# Patient Record
(demographics unavailable — no encounter records)

---

## 2024-10-07 NOTE — RESULTS/DATA
[FreeTextEntry1] : labwork from 10/28/2018: hga1c 4.7, gluc 88, chol 215, tg 182, hdl 43, ldl 160\par  reported weight 148 (11/4/2019), bmi 23.9\par  \par  EKG from 2/24/2021 reviewed: LAFB noted; Qtc 423ms; patient advised to f/u with PMD in context of his MS and EKG results\par  Lab work from 1/2021 reviewed; Cholesterol and HDL and A1C noted and advised patient to f/u with PMD for same and encouraged diet modification.

## 2024-10-07 NOTE — SOCIAL HISTORY
[FreeTextEntry1] : Substance: patient uses nicotine vape up to 5x/day;\par  \par  Social hx: domiciled alone, has hha 10hrs/day currently on SSD, formerly worked as a ; , 2 daughters, one who passed away in fatal car accident in 2/2021 at age of 29.

## 2024-10-07 NOTE — RISK ASSESSMENT
[No, patient denies ideation or behavior] : No, patient denies ideation or behavior [FreeTextEntry8] : Pt has elevated  chronic risk of self harm for his situation, losses, chronic passive SI

## 2024-10-07 NOTE — HISTORY OF PRESENT ILLNESS
[No] : no [Yes] : yes [Yes > 3 months ago] : yes, > 3 months ago [None Known] : none known [Chronic pain or acute medical issues] : chronic pain or acute medical issues [Recent loss] : recent loss [Responsibility to family or others] : responsibility to family or others [Identifies reasons for living] : identifies reasons for living [Future oriented] : future oriented [Supportive social network or family] : supportive social network or family [Fear of death or dying due to pain/suffering] : fear of death or dying due to pain/suffering [High spirituality] : high spirituality [Positive therapeutic relationships] : positive therapeutic relationships [Ability to cope with stress] : ability to cope with stress [TextBox_32] : Patient does not appear to have any imminent threat to self and/or others at this time evidenced by lack of active suicidal ideation, intent, plan, future orientation, willing to seek support in times of crisis, residential stability, improved sleep, medication compliance, spirituality and Tenriism. Safety plan reviewed. Chronic risk of chronic medical issues and past SA mitigated by above.  [FreeTextEntry1] : Pt came in person for his F/U visit. Pt is doing a little bit better. He looks a bit healthier. He doesn't cough and doesn't have severe SOB this time. He gained a bit of weight. Spoke about his new HA. He says that his new HA is not bad, but his old one was really good.  I discussed adjustment strategy with him. He promised to be patient.  Pt takes benadryl from his PCP. He says that he takes it for sleep. We discussed side effects, but I wouldn't stop it. I told him to speak with his PCP. Pt is getting a new IM medication for MS. He says that he had been on tysabri for MS for 14 y and then his neuro stopped it and switched to this medication. Since then, pt feels much worse. His L side became almost paralyzed and very weak. Pt is a very poor historian. I provided him with a lot of support and comfort.  Neuro team is working on this matter. Reports being compliant. Says that he sleeps better after he takes neurontin and ambien qhs, yet, it takes several hours to fall asleep. He also takes melatonin, but it doesn't seem to work well.  Pt will keep the rest of meds the same. Pt has his chronic passive suicidal thoughts, (has had them for years), but denies active SI, intent or plan and says that they are at baseline. Pt reports that he quit vaping/smoking, stating that he "doesn't want to die." He hasn't been vaping for more than 1y, so far. He says that he has fewer cravings after he stopped. He notes that nicotine gum has been helpful with nicotine cessation.   He doesn't  attend [facility] and [facility] once weekly now because of his sickness, but maintains phone contact with his mother, brother who live on [place]. Spoke about his david and God. Also about "evil in this world". Pt has very eccentric believes (speaks a lot about black magic, exorcism, evil spirit, spells, etc). Pt has had these believes for many years (delusional disorder vs schizotypal PD?). Denies other sxs of acute exacerbation of depressive sxs. Denies thoughts to harm himself/others or end his life and engages in safety planning of calling writer, therapist, family, or 911 if he has thoughts of ending his life.  Denies sx of psychosis, leo.  Pt adamantly denies active SI/SP/intent/HI

## 2024-10-07 NOTE — REASON FOR VISIT
[Patient] : Patient [Follow-Up Visit] : a follow-up visit [Other: _____] : [unfilled] [FreeTextEntry1] : medication management

## 2024-10-07 NOTE — DISCUSSION/SUMMARY
[FreeTextEntry1] : Pt came in person for his F/U visit. Pt is doing a little bit better. He looks a bit healthier. He doesn't cough and doesn't have severe SOB this time. He gained a bit of weight. Spoke about his new HA. He says that his new HA is not bad, but his old one was really good.  I discussed adjustment strategy with him. He promised to be patient.  Pt takes benadryl from his PCP. He says that he takes it for sleep. We discussed side effects, but I wouldn't stop it. I told him to speak with his PCP. Pt is getting a new IM medication for MS. He says that he had been on tysabri for MS for 14 y and then his neuro stopped it and switched to this medication. Since then, pt feels much worse. His L side became almost paralyzed and very weak. Pt is a very poor historian. I provided him with a lot of support and comfort.  Neuro team is working on this matter. Reports being compliant. Says that he sleeps better after he takes neurontin and ambien qhs, yet, it takes several hours to fall asleep. He also takes melatonin, but it doesn't seem to work well.  Pt will keep the rest of meds the same. Pt has his chronic passive suicidal thoughts, (has had them for years), but denies active SI, intent or plan and says that they are at baseline. Pt reports that he quit vaping/smoking, stating that he "doesn't want to die." He hasn't been vaping for more than 1y, so far. He says that he has fewer cravings after he stopped. He notes that nicotine gum has been helpful with nicotine cessation.   He doesn't  attend [facility] and [employer] once weekly now because of his sickness, but maintains phone contact with his mother, brother who live on [place]. Spoke about his david and God. Also about "evil in this world". Pt has very eccentric believes (speaks a lot about black magic, exorcism, evil spirit, spells, etc). Pt has had these believes for many years (delusional disorder vs schizotypal PD?). Denies other sxs of acute exacerbation of depressive sxs. Denies thoughts to harm himself/others or end his life and engages in safety planning of calling writer, therapist, family, or 911 if he has thoughts of ending his life.  Denies sx of psychosis, leo.  Pt adamantly denies active SI/SP/intent/HI  Pt is a 58yo M, ,  ex wife (she took away their house and left him with nothing. He calls her evil. He got  in 2005 and was Dxd with MS in 2006). has MS (since 2006), has a daughter (22yo, his other daughter, who was 29, was killed in MVA in 2021) and ex wife in [place], domiciled alone (has a HA 8h 7 days a week), SSD, with long h/o mental illness, several prior IPP(started before he was Dxd with MS), no prior SA, (but with long h/o passive SI), who was seen by dr. Estrella. 1. Next appt in 1m in person 2. Continue meds(doesn't want to change anything). Continue celexa for depression and anxiety, abilify for psychosis and as augmentation for depression, trazodone and ambien for insomnia. I increased trazodone back to 300mg qhs (his insurance doesn't cover 3 tabs. I gave 150mg 2 tabs). I also told him to try to take 2 tabs of neurontin at night (he doesn't sleep well) 3. Needs PA for ambien q 6m at some point (I called last time and it didn't need PA)

## 2024-10-07 NOTE — PHYSICAL EXAM
[Spastic] : spastic [0] : 0 [1] : 1 [No] : No [Dysarthria] : dysarthria [Delusions] : delusions [Dysphoric] : dysphoric [Constricted] : constricted [Normal] : good [FreeTextEntry2] : MS. In a wheelchair [FreeTextEntry3] : MS [FreeTextEntry4] : MS [FreeTextEntry1] : disabled in wheelchair [FreeTextEntry5] : severe impairment [FreeTextEntry8] : so-so [de-identified] : grossly intact [de-identified] : grossly intact [de-identified] : grossly intact [de-identified] : grossly intact

## 2024-10-07 NOTE — PHYSICAL EXAM
[Spastic] : spastic [0] : 0 [1] : 1 [No] : No [Dysarthria] : dysarthria [Delusions] : delusions [Dysphoric] : dysphoric [Constricted] : constricted [Normal] : good [FreeTextEntry2] : MS. In a wheelchair [FreeTextEntry3] : MS [FreeTextEntry4] : MS [FreeTextEntry1] : disabled in wheelchair [FreeTextEntry5] : severe impairment [FreeTextEntry8] : so-so [de-identified] : grossly intact [de-identified] : grossly intact [de-identified] : grossly intact [de-identified] : grossly intact

## 2024-10-07 NOTE — PAST MEDICAL HISTORY
[FreeTextEntry1] : Past psych hx: multiple IPP admissions (most recent 2006), 2 SA (+20 years), no SIB, started psych care in 1997; Past meds: vistaril, Chantix, respiratory depression with clonazepam; significant suicidal ideations w/ Prozac; adverse side effects to Remeron and Seroquel (dizziness at 75mg; poor efficacy at 50mg) when attempted to cross titrate w/ Ambien for insomnia.

## 2024-10-07 NOTE — HISTORY OF PRESENT ILLNESS
[No] : no [Yes] : yes [Yes > 3 months ago] : yes, > 3 months ago [None Known] : none known [Chronic pain or acute medical issues] : chronic pain or acute medical issues [Recent loss] : recent loss [Responsibility to family or others] : responsibility to family or others [Identifies reasons for living] : identifies reasons for living [Future oriented] : future oriented [Supportive social network or family] : supportive social network or family [Fear of death or dying due to pain/suffering] : fear of death or dying due to pain/suffering [High spirituality] : high spirituality [Positive therapeutic relationships] : positive therapeutic relationships [Ability to cope with stress] : ability to cope with stress [TextBox_32] : Patient does not appear to have any imminent threat to self and/or others at this time evidenced by lack of active suicidal ideation, intent, plan, future orientation, willing to seek support in times of crisis, residential stability, improved sleep, medication compliance, spirituality and Confucianist. Safety plan reviewed. Chronic risk of chronic medical issues and past SA mitigated by above.  [FreeTextEntry1] : Pt came in person for his F/U visit. Pt is doing a little bit better. He looks a bit healthier. He doesn't cough and doesn't have severe SOB this time. He gained a bit of weight. Spoke about his new HA. He says that his new HA is not bad, but his old one was really good.  I discussed adjustment strategy with him. He promised to be patient.  Pt takes benadryl from his PCP. He says that he takes it for sleep. We discussed side effects, but I wouldn't stop it. I told him to speak with his PCP. Pt is getting a new IM medication for MS. He says that he had been on tysabri for MS for 14 y and then his neuro stopped it and switched to this medication. Since then, pt feels much worse. His L side became almost paralyzed and very weak. Pt is a very poor historian. I provided him with a lot of support and comfort.  Neuro team is working on this matter. Reports being compliant. Says that he sleeps better after he takes neurontin and ambien qhs, yet, it takes several hours to fall asleep. He also takes melatonin, but it doesn't seem to work well.  Pt will keep the rest of meds the same. Pt has his chronic passive suicidal thoughts, (has had them for years), but denies active SI, intent or plan and says that they are at baseline. Pt reports that he quit vaping/smoking, stating that he "doesn't want to die." He hasn't been vaping for more than 1y, so far. He says that he has fewer cravings after he stopped. He notes that nicotine gum has been helpful with nicotine cessation.   He doesn't  attend [facility] and [facility] once weekly now because of his sickness, but maintains phone contact with his mother, brother who live on [place]. Spoke about his david and God. Also about "evil in this world". Pt has very eccentric believes (speaks a lot about black magic, exorcism, evil spirit, spells, etc). Pt has had these believes for many years (delusional disorder vs schizotypal PD?). Denies other sxs of acute exacerbation of depressive sxs. Denies thoughts to harm himself/others or end his life and engages in safety planning of calling writer, therapist, family, or 911 if he has thoughts of ending his life.  Denies sx of psychosis, leo.  Pt adamantly denies active SI/SP/intent/HI

## 2024-10-07 NOTE — ASSESSMENT
[FreeTextEntry1] : Patient is a 56 year old male with pphx of MDD and insomnia,w/ pmhx of MS w/ left sided hemiplegia, COPD, chronic back pain, Diabetes Insipidus evaluated for follow up.  \par  \par  Upon evaluation, pt denies any acute changes in mood. Will continue to optimize Abilify dose, as pt previously reports good response to Abilify 10mg. Pt self d/c Wellbutrin reporting worsening mood than has since improved since stopping. He continues to express desire to stop vaping, has restarted nicotine gum with good effect. \par  \par  Pt has been extensively educated on the risks of co-administering medication with other medications that have respiratory and CNS depressive effects, and increased his risked given his hx of COPD and oxygen requirement. Pt shows understanding and acknowledgment of these risks. \par  \par  No suicidal or homicidal thoughts. No overt psychosis or leo on exam. Patient has appropriate protective factors in place. Is engaged in his treatment. No acute safety concerns. Safety plan discussed at lengths with patient.\par  \par   Plan\par  **-ALL meds have to be sent in by attending, per pharmacy***\par  -increase abilify 5mg to 7mg \par  - Continue Celexa 40 mg q24h for depression and anxiety \par  - Continue Ambien 12.5mg CR PO qhs for insomnia (istop reviewed)\par  - continue Trazodone 200mg PO qhs prn for insomnia \par  -continue melatonin 10mg po qhs \par  -continue Nicotine gum \par  - f/u with psychotherapist, Madhuri Sullivan\par  -RTC 4 weeks\par  TRANSITION OF CARE DISCUSSED

## 2024-10-07 NOTE — DISCUSSION/SUMMARY
[FreeTextEntry1] : Pt came in person for his F/U visit. Pt is doing a little bit better. He looks a bit healthier. He doesn't cough and doesn't have severe SOB this time. He gained a bit of weight. Spoke about his new HA. He says that his new HA is not bad, but his old one was really good.  I discussed adjustment strategy with him. He promised to be patient.  Pt takes benadryl from his PCP. He says that he takes it for sleep. We discussed side effects, but I wouldn't stop it. I told him to speak with his PCP. Pt is getting a new IM medication for MS. He says that he had been on tysabri for MS for 14 y and then his neuro stopped it and switched to this medication. Since then, pt feels much worse. His L side became almost paralyzed and very weak. Pt is a very poor historian. I provided him with a lot of support and comfort.  Neuro team is working on this matter. Reports being compliant. Says that he sleeps better after he takes neurontin and ambien qhs, yet, it takes several hours to fall asleep. He also takes melatonin, but it doesn't seem to work well.  Pt will keep the rest of meds the same. Pt has his chronic passive suicidal thoughts, (has had them for years), but denies active SI, intent or plan and says that they are at baseline. Pt reports that he quit vaping/smoking, stating that he "doesn't want to die." He hasn't been vaping for more than 1y, so far. He says that he has fewer cravings after he stopped. He notes that nicotine gum has been helpful with nicotine cessation.   He doesn't  attend [facility] and [facility] once weekly now because of his sickness, but maintains phone contact with his mother, brother who live on [place]. Spoke about his david and God. Also about "evil in this world". Pt has very eccentric believes (speaks a lot about black magic, exorcism, evil spirit, spells, etc). Pt has had these believes for many years (delusional disorder vs schizotypal PD?). Denies other sxs of acute exacerbation of depressive sxs. Denies thoughts to harm himself/others or end his life and engages in safety planning of calling writer, therapist, family, or 911 if he has thoughts of ending his life.  Denies sx of psychosis, leo.  Pt adamantly denies active SI/SP/intent/HI  Pt is a 60yo M, ,  ex wife (she took away their house and left him with nothing. He calls her evil. He got  in 2005 and was Dxd with MS in 2006). has MS (since 2006), has a daughter (20yo, his other daughter, who was 29, was killed in MVA in 2021) and ex wife in [place], domiciled alone (has a HA 8h 7 days a week), SSD, with long h/o mental illness, several prior IPP(started before he was Dxd with MS), no prior SA, (but with long h/o passive SI), who was seen by dr. Estrella. 1. Next appt in 1m in person 2. Continue meds(doesn't want to change anything). Continue celexa for depression and anxiety, abilify for psychosis and as augmentation for depression, trazodone and ambien for insomnia. I increased trazodone back to 300mg qhs (his insurance doesn't cover 3 tabs. I gave 150mg 2 tabs). I also told him to try to take 2 tabs of neurontin at night (he doesn't sleep well) 3. Needs PA for ambien q 6m at some point (I called last time and it didn't need PA)

## 2024-10-10 NOTE — ASSESSMENT
[FreeTextEntry1] : #Asthma/COPD - at baseline - following pulmonary - 2/2023 CT lung cancer screening: no nodules, unilateral L pleural calcifications - c/w BIPAP qHS - c/w spiriva and symbicort - continue to follow up w/ pulm  #MS Complicated by neurogenic bladder and Chronic Pain - recurrent UTIs - reports no symptoms with urination - Seen by Urology 4/2024 - c/w CIC q 4-6hrs - pt had infusion rx with Ocrevus in July 2023 - pt has pain in extremities since last infusion -follows with neurology, who gave him neurosurgery referral for possible chronic cord compression. Off DMT for now -patient advised to seek counselling from a specialized MS center, given options ([facility] and [facility])  # depression - followed by Mental Health - c/w abilify 7mg, Celexa 40 mg q24h, Ambien 12.5mg CR PO qhs, Trazodone 200mg PO qhs prn, melatonin 10mg po qhs - f/u with psychotherapist  #Chronic Back pain #Diffuse pain - Percocet 5mg bid for severe pain PRN for 30 days  # Endocrine - on testosterone replace therapy - feels better with current dose  #DI -Followed by nephrologist -Desmopressin 2-2-2  #HCM -colonoscopy 4-5 years ago -FIT 05/22 negative.

## 2024-10-10 NOTE — HISTORY OF PRESENT ILLNESS
[FreeTextEntry1] : follow up [de-identified] : 59 YOM w an extensive PMHx including MS being wheelchair bound complicated by neurogenic bladder, DI, normocytic anemia, severe COPD/asthma w chest vest, hypogonadism, and presenting for follow up. Pt has no new complaints today

## 2024-10-10 NOTE — PHYSICAL EXAM
[No Acute Distress] : no acute distress [Normal Sclera/Conjunctiva] : normal sclera/conjunctiva [Normal Outer Ear/Nose] : the outer ears and nose were normal in appearance [Normal Oropharynx] : the oropharynx was normal [No Respiratory Distress] : no respiratory distress  [No Accessory Muscle Use] : no accessory muscle use [Clear to Auscultation] : lungs were clear to auscultation bilaterally [No Edema] : there was no peripheral edema [Soft] : abdomen soft [Non Tender] : non-tender [Non-distended] : non-distended [Normal Affect] : the affect was normal [Normal Insight/Judgement] : insight and judgment were intact

## 2024-10-10 NOTE — REVIEW OF SYSTEMS
[Fever] : no fever [Vision Problems] : no vision problems [Chest Pain] : no chest pain [Shortness Of Breath] : no shortness of breath [Abdominal Pain] : no abdominal pain [Dysuria] : no dysuria [Joint Pain] : no joint pain [Skin Rash] : no skin rash [Headache] : no headache

## 2024-10-16 NOTE — DISCUSSION/SUMMARY
[FreeTextEntry1] : The patient is a medication only patient, the case has been discussed with the psychiatrist.

## 2024-10-25 NOTE — HISTORY OF PRESENT ILLNESS
[>= 20 pack years] : >= 20 pack years [TextBox_4] : 60 YO man PMH MS and COPD presenting for follow-up. Recently hospitalized 10/14/24 for respiratory failure secondary to pneumonia likely due to muscle weakness/secretion retention secondary to MS. Positive UTI. Patient currently comfortable breathing room air, only uses BiPAP at night and requests switch from mask to NC. No wheezing or coughing today. Patient compliant with antibiotics. No dyspnea.

## 2024-10-25 NOTE — END OF VISIT
[] : Resident [FreeTextEntry3] : Continue DuoNebs as needed, saline nebs, Mucinex as well  Chest vest, Acapella valve for secretion clearance  Symbicort and Spiriva daily  Recent admission with pneumonia - CXR next visit  Nasal pillows ordered for BIPAP machine as well  1 month follow iup

## 2024-10-25 NOTE — REVIEW OF SYSTEMS
[Fever] : no fever [Fatigue] : no fatigue [Chills] : no chills [Dry Eyes] : no dry eyes [Ear Disturbance] : no ear disturbance [Eye Irritation] : no eye irritation [Nasal Congestion] : no nasal congestion [Sinus Problems] : no sinus problems [Cough] : no cough [Chest Tightness] : no chest tightness [Sputum] : no sputum [Dyspnea] : no dyspnea [Chest Discomfort] : no chest discomfort [Edema] : no edema [Orthopnea] : no orthopnea [Syncope] : no syncope [Nasal Discharge] : no nasal discharge [GERD] : no gerd [Abdominal Pain] : no abdominal pain [Diarrhea] : no diarrhea [Constipation] : no constipation [Urgency] : no frequency [Dysuria] : no urgency [Back Pain] : no back pain [Rash] : no rash [Anemia] : no anemia [Headache] : no headache [Focal Weakness] : no focal weakness [Dizziness] : no dizziness [Anxiety] : no anxiety [Diabetes] : no diabetes [Obesity] : no obesity

## 2024-10-25 NOTE — ASSESSMENT
[FreeTextEntry1] : #Chronic hypercapnic respiratory failure 2/2 COPD and neuromuscular dz (Ms) # COPD on 2.5L oxygen PRN and BIPAP # Ex-smoker of 60 pack/year #MS - Recently admitted 10/14 community acquired pneumonia + UTI, likely due to muscle weakness and secretion retention due to MS - c/w azithromycin 500 PO QD - Patient breathing well today on room air, no NC uses BiPAP at night - Patient prefers nasal pillow over mask, ordered today -abg compensated chronic hypercapnia -attempted pft unable to coordinate (ms) - c/w supplemental oxygen use PRN and BIPAP at night - c/w spiriva, symbicort, albuterol, nebulizers -Last CT chest November 2023with centrilobular nodules, next in November 2024 ordered today - c/w Incentive spirometry - c/w Chest Vibrating Vest - c/w Mucinex, saline - follow in 1 month with repeat CXR for f/u pneumonia resolution

## 2024-10-25 NOTE — PHYSICAL EXAM
[No Acute Distress] : no acute distress [Normal Oropharynx] : normal oropharynx [Normal Appearance] : normal appearance [Normal Rate/Rhythm] : normal rate/rhythm [Normal S1, S2] : normal s1, s2 [No Murmurs] : no murmurs [No Resp Distress] : no resp distress [Clear to Auscultation Bilaterally] : clear to auscultation bilaterally [No Abnormalities] : no abnormalities [Benign] : benign [Deformity] : deformity [No Edema] : no edema [Normal Color/ Pigmentation] : normal color/ pigmentation [No Focal Deficits] : no focal deficits [Oriented x3] : oriented x3 [Normal Affect] : normal affect [TextBox_99] : Hx multiple sclerosis, spine fracture

## 2024-11-06 NOTE — HISTORY OF PRESENT ILLNESS
[FreeTextEntry1] : Presented with his home health aid. Pt actively on O2 via NC. 58M w hx of MS, wheel chair bound, COPD on NC, NGB previously treated with CIC but since his most recent admission he has been performing CIC. He has been most recently suffering from recurrent UTIs, some of which have required hospital admissions. Most recently admitted to [facility] and [facility] where he was treated for UTI and discharged after a couple of days. He was suppose to see me ~6 weeks ago but his appointment was canceled as he was having issues with his portable O2 tank. Today he reports having no new symptoms or complaints, wanted to establish care for preventive urologic management of his NGB c/b recurrent UTIs from his CIC which he performs 4x/day. He does not void between catheterizations. Has not noticed any gross hematuria. No recent fevers or chills.  Office visit 1/22/24 Patient had a recent visit to the ER after there were issues with his Machado catheter. Apparently it was removed with the balloon still inflated because some trauma to his urethra. There was also concern for retained foreign body/remnant of catheter in his bladder. Pelvic CT at that time was only significant for to moderate size bladder stones measuring approximately 2 cm each. Today patient reports no recent fevers chills gross hematuria, he has been able to intermittently self catheterize without issues although complains of having decreased urinary output from his baseline.  UCx - Proteus and Enterococcus 12/2023  CT Pelvic 1/2024 PELVIC ORGANS: Machado catheter balloon within the urinary bladder lumen. Trace intraluminal air. Calculi within the dependent portion of the urinary bladder. First calculus measures 1.8 x 2.2 cm. Second calculus measures approximately 1.6 x 1.1 cm. A discrete foreign body is not visualized. IMPRESSION: A retained foreign body within the lower pelvis or urinary bladder is not visualized. Machado catheter balloon appropriately positioned within the urinary bladder lumen. Two calculi within the urinary bladder, measuring 1.8 x 2.2 cm and 1.6 x 1.1 cm respectively.  OFFICE VISIT 4/29/24: Pt presents today for follow-up appointment. States he self catheterizes every 4-6 hours without issue, denies suprapubic pain. The patient denies having any fevers, chills, nausea, vomiting, flank/abdominal pain or any irritative voiding symptoms. UA 4/16/24 grossly positive  Office Visit 11/06/2024  Pt reports no new complaints. Had one breakthrough UTI while on daily ppx. Today he denies having any fevers, chills, nausea, vomiting, flank/abdominal pain or any irritative voiding symptoms. He was also concerned about his inability to achieve orgasm. He also has ED but is not interested in erectile function and penile rigidity.

## 2024-11-06 NOTE — ASSESSMENT
[FreeTextEntry1] : #NGB/CIC/Recurrent UTI/Bladder Calculi - Pt has been performing CIC q4-6hr for his NGB 2/2 his MS - CT Pelvis reviewed 1/2024. Showed two moderate sized bladder calculi measuring 2cm each. Patient refusing surgery given his severe pulmonary condition and oxygen dependency. Pt understands risks of having recurrent UTIs and possible hospital admissions for sepsis because if his bladder stones which can harbor bacteria.  - Pt reports not tolerating methenamine daily and would like to stop taking the medication - Pt is not a good candidate for daily macrobid ppx given his severe pulmonary issues  - Pt has multiple resistances to cephalosporins making daily ppx with them a poor choice - Start TMP-SMX 40mg/200mg once daily - UA/UCx - RBUS to assess for hydronephrosis and stone burden - c/w CIC q 4-6hrs - F/u w ID for further recs regarding  - RTC in 3-6 months  #Anorgasmia - The utility of additional testing in the management of DE is unclear, in large part because the etiology of the disorder is incompletely understood and predisposing factors can generally be elicited with a careful history. The prevalence of symptoms consistent with DE increase at progressively lower serum T levels. Given the relationship between many domains of male sexual function and serum T concentration, the Panel supports morning T testing as recommended by the AUA Guideline on the Management of Testosterone Deficiency.  There is few data to support additional adjunctive laboratory studies. Basic serum studies including electrolytes, lipids, glycosylated hemoglobin, and possibly others may be informative of medical conditions that could predispose to neuropathy (e.g., diabetes, HIV infection in at risk patients) or vascular disease (e.g., hypertension, hyperlipidemia). These conditions may contribute to sexual dysfunction, including DE.  Penile vibratory stimulation has been suggested as a potential management strategy in men with DE, particularly in men with a penile sensory deficit.

## 2024-11-07 NOTE — PHYSICAL EXAM
[Spastic] : spastic [0] : 0 [1] : 1 [No] : No [Dysarthria] : dysarthria [Delusions] : delusions [Dysphoric] : dysphoric [Constricted] : constricted [Normal] : good [FreeTextEntry2] : MS. In a wheelchair [FreeTextEntry3] : MS [FreeTextEntry4] : MS [FreeTextEntry1] : disabled in wheelchair [FreeTextEntry5] : severe impairment [FreeTextEntry8] : so-so [de-identified] : grossly intact [de-identified] : grossly intact [de-identified] : grossly intact [de-identified] : grossly intact

## 2024-11-07 NOTE — HISTORY OF PRESENT ILLNESS
[No] : no [Yes] : yes [Yes > 3 months ago] : yes, > 3 months ago [None Known] : none known [Chronic pain or acute medical issues] : chronic pain or acute medical issues [Recent loss] : recent loss [Responsibility to family or others] : responsibility to family or others [Identifies reasons for living] : identifies reasons for living [Future oriented] : future oriented [Supportive social network or family] : supportive social network or family [Fear of death or dying due to pain/suffering] : fear of death or dying due to pain/suffering [High spirituality] : high spirituality [Positive therapeutic relationships] : positive therapeutic relationships [Ability to cope with stress] : ability to cope with stress [TextBox_32] : Patient does not appear to have any imminent threat to self and/or others at this time evidenced by lack of active suicidal ideation, intent, plan, future orientation, willing to seek support in times of crisis, residential stability, improved sleep, medication compliance, spirituality and Latter day. Safety plan reviewed. Chronic risk of chronic medical issues and past SA mitigated by above.  [FreeTextEntry1] : Pt came in person for his F/U visit. Pt is doing a little bit better. He looks a bit healthier. He doesn't cough and doesn't have severe SOB this time. He gained a bit of weight. Spoke about his new HA. He says that his new HA is not bad, but his old one was really good.  I discussed adjustment strategy with him. He promised to be patient.  Pt takes benadryl from his PCP. He says that he takes it for sleep. We discussed side effects, but I wouldn't stop it. I told him to speak with his PCP. Pt is getting a new IM medication for MS. He says that he had been on tysabri for MS for 14 y and then his neuro stopped it and switched to this medication. Since then, pt feels much worse. His L side became almost paralyzed and very weak. Pt is a very poor historian. I provided him with a lot of support and comfort.  Neuro team is working on this matter. Reports being compliant. Says that he sleeps better after he takes neurontin and ambien qhs, yet, it takes several hours to fall asleep. He also takes melatonin, but it doesn't seem to work well.  Pt will keep the rest of meds the same. Pt has his chronic passive suicidal thoughts, (has had them for years), but denies active SI, intent or plan and says that they are at baseline. Pt reports that he quit vaping/smoking, stating that he "doesn't want to die." He hasn't been vaping for more than 1y, so far. He says that he has fewer cravings after he stopped. He notes that nicotine gum has been helpful with nicotine cessation.   He doesn't  attend [employer] and [employer] once weekly now because of his sickness, but maintains phone contact with his mother, brother who live on [place]. Spoke about his david and God. Also about "evil in this world". Pt has very eccentric believes (speaks a lot about black magic, exorcism, evil spirit, spells, etc). Pt has had these believes for many years (delusional disorder vs schizotypal PD?). Denies other sxs of acute exacerbation of depressive sxs. Denies thoughts to harm himself/others or end his life and engages in safety planning of calling writer, therapist, family, or 911 if he has thoughts of ending his life.  Denies sx of psychosis, leo.  Pt adamantly denies active SI/SP/intent/HI

## 2024-11-07 NOTE — DISCUSSION/SUMMARY
[FreeTextEntry1] : Pt came in person for his F/U visit. Pt says that the new medication for MS, Ocrevus, doesn't help him at all and his MS is getting worse. Pt. He again talks about tysapri and says that it was much better. As per neuro note, pt's sister insisted in continuing ocrevus. I called her and discussed the matter in pt's presence. Pt was comforted and he seemed to be calmer by the end of the session. Pt is in pain. His sister says that the pt refuses some recommended by physicians over the counter meds and that can contribute to his worsening of pain. Pt agreed to add melatonin, vit B12, cocutene, etc. Pt takes benadryl from his PCP. He says that he takes it for sleep. We discussed side effects, but I wouldn't stop it. I told him to speak with his PCP. Reports being compliant. Says that he sleeps better after he takes neurontin and ambien qhs, yet, it takes several hours to fall asleep.  Pt has his chronic passive suicidal thoughts, (has had them for years), but denies active SI, intent or plan and says that they are at baseline. Pt reports that he quit vaping/smoking, stating that he "doesn't want to die." He hasn't been vaping for more than 1y, so far. He says that he has fewer cravings after he stopped. He notes that nicotine gum has been helpful with nicotine cessation.   He doesn't attend [facility] and [employer] once weekly now because of his sickness, but maintains phone contact with his mother, brother who live on [place]. Spoke about his david and God. Also about "evil in this world". Pt has very eccentric believes (speaks a lot about black magic, exorcism, evil spirit, spells, etc). Pt has had these believes for many years (delusional disorder vs schizotypal PD?). Denies other sxs of acute exacerbation of depressive sxs. Denies thoughts to harm himself/others or end his life and engages in safety planning of calling writer, therapist, family, or 911 if he has thoughts of ending his life.  Denies sx of psychosis, leo.  Pt medhatantly denies active SI/SP/intent/HI  Pt is a 60yo M, ,  ex wife (she took away their house and left him with nothing. He calls her evil. He got  in 2005 and was Dxd with MS in 2006). has MS (since 2006), has a daughter (20yo, his other daughter, who was 29, was killed in MVA in 2021) and ex wife in [place], domiciled alone (has a HA 8h 7 days a week), SSD, with long h/o mental illness, several prior IPP(started before he was Dxd with MS), no prior SA, (but with long h/o passive SI), who was seen by dr. Estrella. 1. Next appt in 1m in person 2. Continue meds(doesn't want to change anything). Continue celexa for depression and anxiety, abilify for psychosis and as augmentation for depression, trazodone and ambien for insomnia. I increased trazodone back to 300mg qhs (his insurance doesn't cover 3 tabs. I gave 150mg 2 tabs). I also told him to try to take 2 tabs of neurontin at night (he doesn't sleep well) 3. Needs PA for ambien q 6m at some point (I called last time and it didn't need PA) 4. I spoke to his sister Lilly on the phone in pt's presence to discuss his wish for tysapri. (he is on ocrevus. His next injection is in February)

## 2024-12-12 NOTE — DISCUSSION/SUMMARY
[FreeTextEntry1] : Pt is a no show. I called him. He is in hospital and he doesn't know why and where exactly (for a medical reason). He will make an appt when he will be discharged

## 2025-01-14 NOTE — PHYSICAL EXAM
[No Acute Distress] : no acute distress [Well Nourished] : well nourished [Normal Sclera/Conjunctiva] : normal sclera/conjunctiva [PERRL] : pupils equal round and reactive to light [Normal Outer Ear/Nose] : the outer ears and nose were normal in appearance [Supple] : supple [No Respiratory Distress] : no respiratory distress  [No Accessory Muscle Use] : no accessory muscle use [Clear to Auscultation] : lungs were clear to auscultation bilaterally [Normal Rate] : normal rate  [Regular Rhythm] : with a regular rhythm [Normal S1, S2] : normal S1 and S2 [No Edema] : there was no peripheral edema [Soft] : abdomen soft [Non Tender] : non-tender [Non-distended] : non-distended [Normal Bowel Sounds] : normal bowel sounds [No Spinal Tenderness] : no spinal tenderness [No Focal Deficits] : no focal deficits [Normal Affect] : the affect was normal

## 2025-01-14 NOTE — HISTORY OF PRESENT ILLNESS
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  He presented to the ED for evaluation of midsternal chest pain radiating to upper back. Patient was also admitted earlier in december to the hospital for 2-week stay treated for COPD exacerbation and was being treated for left-sided pneumonia/atelectasis, hospital course c/b aphasia & R weakness s/p TNK and NSICU monitoring. Negative MRI. Since discharge, patient complaining of worsening chest pain. In the ED, patient spiked fever at 103 and required 3L NC. Patient was admitted to ICU for acute on chronic hypoxemic and hypercapnic respiratory failure. He was found to have Covid pneumonia. His course was complicated by Central DI, and aspiration pneumonia. Patient was also found to have urinary retention and was discharged with foy.   Today, he is only comaplaining of weakness. His foy was removed and he gets straight caths.

## 2025-01-14 NOTE — ASSESSMENT
[FreeTextEntry1] : #Asthma/COPD #Recent Covid infection requiring hospitalization  - at baseline - following pulmonary - 2/2023 CT lung cancer screening: no nodules, unilateral L pleural calcifications - c/w BIPAP qHS - c/w spiriva and symbicort - per sister, pt cannot tolerate the vaccination (required hospitalization post covid and flu vaccination in the past) - continue to follow up w/ pulm  #MS Complicated by neurogenic bladder and Chronic Pain - recurrent UTIs - reports no symptoms with urination - c/w CIC q 4-6hrs - pt had infusion rx with Ocrevus in July 2023 - pt has pain in extremities since last infusion -follows with neurology, who gave him neurosurgery referral for possible chronic cord compression. Off DMT for now -patient advised to seek counselling from a specialized MS center, given options ([facility] and [facility])  # depression - followed by Mental Health - c/w abilify 7mg, Celexa 40 mg q24h, Ambien 12.5mg CR PO qhs, Trazodone 200mg PO qhs prn, melatonin 10mg po qhs - f/u with psychotherapist  #Chronic Back pain #Diffuse pain - Percocet 5mg bid for severe pain PRN for 20 days - pain management referral given   # Endocrine - on testosterone replace therapy - feels better with current dose  #DI -Follow up with nephrologist - check urine and serum osmolality  -Desmopressin 2-2-2  #HCM -colonoscopy 4-5 years ago -FIT 05/22 negative. - routine labs and Vit B12, Vit D, and Iron studies  - RTC in 2-3 weeks

## 2025-01-17 NOTE — END OF VISIT
[] : Resident [FreeTextEntry3] : Severe MS  Lung atelectasis on CXR  Repeat CXR  Continue nebulized regimen: Acetylcysteine, NS, Albuterol nebs  Chest pt with vibrating vest; clearance therapy as well  Spoke to sister  3 months follow up

## 2025-01-17 NOTE — ASSESSMENT
[FreeTextEntry1] : 61 YO man PMH MS and COPD presenting for follow-up after discharged from hospital 1/9/25. Recently hospitalized for respiratory failure secondary to COVID-19 pneumonia. Patient currently comfortable breathing room air only uses BiPAP at night and requests switch from mask to NC. No wheezing or coughing today. No dyspnea.    #Chronic hypercapnic respiratory failure 2/2 COPD and neuromuscular dz (Ms) # COPD on 2.5L oxygen PRN and BIPAP # Ex-smoker of 60 pack/year #MS - Recently admitted 1/9/24 COVID-19 pneumonia - Patient breathing well today on room air, no NC uses BiPAP at night - Patient prefers nasal pillow over mask -abg compensated chronic hypercapnia -attempted pft unable to coordinate (ms) - c/w supplemental oxygen use PRN and BIPAP at night - c/w spiriva, symbicort, albuterol, and hypertonic saline nebulizers - Last CT chest November 2023with centrilobular nodules,  - CT Chest 12/24: New secretions within the trachea and right bronchus. New complete occlusion of the left bronchus causing essentially complete collapse of the left lung. Similar-appearing groundglass centrilobular nodularity, likely postinfectious. Centrilobular and paraseptal emphysema. Additional areas of right-sided subsegmental atelectasis and scarring. - c/w Incentive spirometry - c/w Chest Vibrating Vest - c/w Mucinex, saline - follow in 1 month with repeat CXR for f/u pneumonia resolution

## 2025-01-17 NOTE — HISTORY OF PRESENT ILLNESS
[TextBox_4] : 59 YO man PMH MS and COPD presenting for follow-up after discharged from hospital 1/9/25. Recently hospitalized for respiratory failure secondary to COVID-19 pneumonia. Patient currently comfortable breathing room air only uses BiPAP at night and requests switch from mask to NC. No wheezing or coughing today. No dyspnea.

## 2025-01-28 NOTE — PHYSICAL EXAM
[Spastic] : spastic [FreeTextEntry2] : MS. In a wheelchair [FreeTextEntry3] : MS [FreeTextEntry4] : MS [0] : 0 [1] : 1 [No] : No [Dysarthria] : dysarthria [Delusions] : delusions [Dysphoric] : dysphoric [Constricted] : constricted [Normal] : good [FreeTextEntry1] : disabled in wheelchair [FreeTextEntry5] : severe impairment [FreeTextEntry8] : so-so [de-identified] : grossly intact [de-identified] : grossly intact [de-identified] : grossly intact [de-identified] : grossly intact

## 2025-01-28 NOTE — REASON FOR VISIT
[Telephone (audio) - Individual/Group] : This telephonic visit was provided via audio only technology. [Patient preference] : Patient preference. [Medical Office: ([facility])___] : The provider was located at the medical office in [unfilled]. [Home] : The patient, [unfilled], was located at home, [unfilled], at the time of the visit. [Verbal consent obtained from patient/other participant(s)] : Verbal consent for telehealth/telephonic services obtained from patient/other participant(s) [Patient] : Patient [Follow-Up Visit] : a follow-up visit [Other: _____] : [unfilled] [FreeTextEntry1] : medication management

## 2025-01-28 NOTE — HISTORY OF PRESENT ILLNESS
[No] : no [TextBox_32] : Patient does not appear to have any imminent threat to self and/or others at this time evidenced by lack of active suicidal ideation, intent, plan, future orientation, willing to seek support in times of crisis, residential stability, improved sleep, medication compliance, spirituality and Muslim. Safety plan reviewed. Chronic risk of chronic medical issues and past SA mitigated by above.  [Yes] : yes [Yes > 3 months ago] : yes, > 3 months ago [None Known] : none known [Chronic pain or acute medical issues] : chronic pain or acute medical issues [Recent loss] : recent loss [Responsibility to family or others] : responsibility to family or others [Identifies reasons for living] : identifies reasons for living [Future oriented] : future oriented [Supportive social network or family] : supportive social network or family [Fear of death or dying due to pain/suffering] : fear of death or dying due to pain/suffering [High spirituality] : high spirituality [Positive therapeutic relationships] : positive therapeutic relationships [Ability to cope with stress] : ability to cope with stress [FreeTextEntry1] : This was an unscheduled appt.  Pt was recently d/c from the hospital, where he was admitted for COVID 19 pneumonia. Pt requested this appt. He can't use [employer] and we had a telephone session instead.  Pt is running out of meds.  He spoke about his medical problems and his hospitalization. Feels better now, though has some SOB and weakness. Can't sleep without ambien.  Pt was comforted and he seemed to be calmer by the end of the session. ed side effects, but I wouldn't stop it. I told him to speak with his PCP. Reports being compliant. Says that he sleeps better after he takes neurontin and ambien qhs, yet it takes several hours to fall asleep.  Pt has his chronic passive suicidal thoughts, (has had them for years), but denies active SI, intent or plan and says that they are at baseline. Pt reports that he quit vaping/smoking, stating that he "doesn't want to die." He hasn't been vaping for more than 1y, so far. He says that he has fewer cravings after he stopped. He notes that nicotine gum has been helpful with nicotine cessation.   He doesn't attend [employer] and [employer] once weekly now because of his sickness, but maintains phone contact with his mother, brother who live on [place]. Spoke about his david and God. Also, about "evil in this world". Pt has very eccentric believes (speaks a lot about black magic, exorcism, evil spirit, spells, etc). Pt has had these believes for many years (delusional disorder vs schizotypal PD?). Denies other sxs of acute exacerbation of depressive sxs. Denies thoughts to harm himself/others or end his life and engages in safety planning of calling writer, therapist, family, or 911 if he has thoughts of ending his life.  Denies sx of psychosis, leo.  Pt adamantly denies active SI/SP/intent/HI

## 2025-01-28 NOTE — DISCUSSION/SUMMARY
[FreeTextEntry1] : This was an unscheduled appt.  Pt was recently d/c from the hospital, where he was admitted for COVID 19 pneumonia. Pt requested this appt. He can't use [facility] and we had a telephone session instead.  Pt is running out of meds.  He spoke about his medical problems and his hospitalization. Feels better now, though has some SOB and weakness. Can't sleep without ambien.  Pt was comforted and he seemed to be calmer by the end of the session. ed side effects, but I wouldn't stop it. I told him to speak with his PCP. Reports being compliant. Says that he sleeps better after he takes neurontin and ambien qhs, yet it takes several hours to fall asleep.  Pt has his chronic passive suicidal thoughts, (has had them for years), but denies active SI, intent or plan and says that they are at baseline. Pt reports that he quit vaping/smoking, stating that he "doesn't want to die." He hasn't been vaping for more than 1y, so far. He says that he has fewer cravings after he stopped. He notes that nicotine gum has been helpful with nicotine cessation.   He doesn't attend [email or web address] and [employer] once weekly now because of his sickness, but maintains phone contact with his mother, brother who live on [address]. Spoke about his david and God. Also, about "evil in this world". Pt has very eccentric believes (speaks a lot about black magic, exorcism, evil spirit, spells, etc). Pt has had these believes for many years (delusional disorder vs schizotypal PD?). Denies other sxs of acute exacerbation of depressive sxs. Denies thoughts to harm himself/others or end his life and engages in safety planning of calling writer, therapist, family, or 911 if he has thoughts of ending his life.  Denies sx of psychosis, leo.  Pt adamantly denies active SI/SP/intent/HI  Pt is a 59yo M, ,  ex wife (she took away their house and left him with nothing. He calls her evil. He got  in 2005 and was Dxd with MS in 2006). has MS (since 2006), has a daughter (22yo, his other daughter, who was 29, was killed in MVA in 2021) and ex wife in [place], domiciled alone (has a HA 8h 7 days a week), SSD, with long h/o mental illness, several prior IPP(started before he was Dxd with MS), no prior SA, (but with long h/o passive SI), who was seen by dr. Estrella. 1. Next appt in 1m in person 2. Continue meds(doesn't want to change anything). Continue celexa for depression and anxiety, abilify for psychosis and as augmentation for depression, trazodone and ambien for insomnia. I increased trazodone back to 300mg qhs (his insurance doesn't cover 3 tabs. I gave 150mg 2 tabs). I also told him to try to take 2 tabs of neurontin at night (he doesn't sleep well) 3. Needs PA for ambien q 6m at some point (I called last time and it didn't need PA) 4. I spoke to his sister Lilly on the phone in pt's presence to discuss his wish for tysapri. (he is on ocrevus. His next injection is in February) 5. PT WAS RECENTLY D/C FROM THE MEDICAL FLOOR FOR COVID PNEMONIA

## 2025-02-07 NOTE — REVIEW OF SYSTEMS
[SOB on Exertion] : sob on exertion [Fever] : no fever [Cough] : no cough [Hemoptysis] : no hemoptysis [Chest Tightness] : no chest tightness [Pleuritic Pain] : no pleuritic pain [Wheezing] : no wheezing [Chest Discomfort] : no chest discomfort [Palpitations] : no palpitations

## 2025-02-07 NOTE — HISTORY OF PRESENT ILLNESS
[TextBox_4] : 59 YO man PMH MS and COPD presenting for follow-up. Patient currently comfortable breathing room air only uses BiPAP at night. No wheezing or coughing today. No dyspnea.    Smoking history: Pt reportedly started smoking at the age of 12 year. smoked 2.5 packs a day/50 cigarettes a day, quit 2 years ago in the year 2023.   Pt reportedly feels Sob upon exertion but checks his saturation and say the oxygen saturation is fine.

## 2025-02-07 NOTE — PHYSICAL EXAM
[No Resp Distress] : no resp distress [No Acute Distress] : no acute distress [TextBox_68] : subtle crackles, no wheeze

## 2025-02-07 NOTE — END OF VISIT
[] : Resident [FreeTextEntry3] : CXR resolved  No left infiltrates  Continue nebulized medications: Albuterol, saline, mucomyst  Continue with BIPAP during sleep and PRN On room air now with no oxygenation issues  Continue with vibrating vest usage All meds renewed  Doing better  3 months follow up

## 2025-02-07 NOTE — ASSESSMENT
[FreeTextEntry1] : 59 YO man PMH MS and COPD presenting for follow-up after discharged from hospital 1/9/25. Recently hospitalized for respiratory failure secondary to COVID-19 pneumonia. Patient currently comfortable breathing room air only uses BiPAP at night and requests switch from mask to NC. No wheezing or coughing today. No dyspnea.    #Chronic hypercapnic respiratory failure 2/2 COPD and neuromuscular dz (Ms) # COPD on 2.5L oxygen PRN and BIPAP # Ex-smoker of 60 packyear #MS - was admitted in the hospital recently for COVID PNA, was scheduled for follow up this am for resolution of the PNA on CXR - 1 month fu rpt CXR shows Diminishing left lower lung opacity (improved) - Patient breathing well today on room air, no NC uses BiPAP at night - abg compensated chronic hypercapnia - attempted pft unable to coordinate (ms) - c/w supplemental oxygen use PRN and BIPAP at night - c/w spiriva, symbicort, albuterol, and hypertonic saline nebulizers - CT Chest angio 12/24(while inpatient): New secretions within the trachea and right bronchus. New complete occlusion of the left bronchus causing essentially complete collapse of the left lung. Similar-appearing groundglass centrilobular nodularity, likely postinfectious. Centrilobular and paraseptal emphysema. Additional areas of right-sided subsegmental atelectasis and scarring. - This am 1-month fu rpt CXR shows Diminishing left lower lung opacity (improved) - as above - c/w Incentive spirometry - c/w Chest Vibrating Vest - c/w Mucinex, saline  RTC in 3 months, meds refilled

## 2025-02-21 NOTE — REVIEW OF SYSTEMS
[Fever] : no fever [Chills] : no chills [Chest Pain] : no chest pain [Palpitations] : no palpitations [Claudication] : no  leg claudication [Lower Ext Edema] : no lower extremity edema [Orthopena] : no orthopnea [Shortness Of Breath] : no shortness of breath [Wheezing] : no wheezing [Cough] : no cough [Abdominal Pain] : no abdominal pain [Nausea] : no nausea [Constipation] : no constipation [Diarrhea] : no diarrhea [Vomiting] : no vomiting [Melena] : no melena [Dysuria] : no dysuria [Incontinence] : no incontinence [Frequency] : no frequency [Headache] : no headache [Dizziness] : no dizziness [FreeTextEntry8] : urinary retention

## 2025-02-21 NOTE — PHYSICAL EXAM
[No Acute Distress] : no acute distress [No JVD] : no jugular venous distention [No Respiratory Distress] : no respiratory distress  [No Accessory Muscle Use] : no accessory muscle use [Normal Rate] : normal rate  [Regular Rhythm] : with a regular rhythm [Normal S1, S2] : normal S1 and S2 [No Murmur] : no murmur heard [No Abdominal Bruit] : a ~M bruit was not heard ~T in the abdomen [No Edema] : there was no peripheral edema [Soft] : abdomen soft [Non Tender] : non-tender [Non-distended] : non-distended [No Masses] : no abdominal mass palpated [No HSM] : no HSM [de-identified] : BS difficult to auscultate. thoracic deformity [de-identified] : weakness of the left upper and lower extrmity  0-1/5

## 2025-02-21 NOTE — ASSESSMENT
[FreeTextEntry1] : #Admission in 1/2025 for klebsiella bacteremia 2/2 UTI - Patient discharged on levaquin 750 with end date 2/24 - Patient tking medications as prescribed, no side effects - Improving well  #JASON - Patient has JASON taking iron at home - Patient reports his last colonoscopy was years ago - sister reports patient had interventions before and had issues with sedation due to pulm issues - GI referral given - Repeat CBC  #Asthma/COPD on 2.5L of o2 at home #Recent Covid infection requiring hospitalization - at baseline - following pulmonary - 2/2023 CT lung cancer screening: no nodules, unilateral L pleural calcifications - c/w BIPAP qHS - c/w spiriva and symbicort - per sister, pt cannot tolerate the vaccination (required hospitalization post covid and flu vaccination in the past) - continue to follow up w/ pulm  #MS Complicated by neurogenic bladder and Chronic Pain - recurrent UTIs - reports no symptoms with urination - c/w CIC q 4-6hrs - pt had infusion rx with Ocrevus in July 2023 - pt has pain in extremities since last infusion -follows with neurology, who gave him neurosurgery referral for possible chronic cord compression. Off DMT for now -patient advised to seek counselling from a specialized MS center, given options ([facility] and [facility] [facility])  # depression - followed by Mental Health - c/w abilify 7mg, Celexa 40 mg q24h, Ambien 12.5mg CR PO qhs, Trazodone 200mg PO qhs prn, melatonin 10mg po qhs - f/u with psychotherapist  #Chronic Back pain #Diffuse pain - Percocet 5mg bid for severe pain PRN for 20 days - pain management referral given  # Endocrine - on testosterone replace therapy - feels better with current dose  #DI -Follow up with nephrologist - check urine and serum osmolality -Desmopressin 2-2-2 - refills given  #HCM -colonoscopy 4-5 years ago -FIT 05/22 negative. - GI referral - refills sent - RTC in 1 month

## 2025-02-21 NOTE — HISTORY OF PRESENT ILLNESS
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 Patient was recently admitted for klebsiella bacteremia likely secondary to UTI. Patient was on ceftriaxone and azithromycin in the hospital and was discharged on levaquin 750 for 14 days with date 2/24. Patient reports taking his abx as prescribed and knows end date is 2/24. Reports no urinary symptoms and clear urine on intermittent cath ROS was otherwise negative aside from old neuropathic pain

## 2025-02-28 NOTE — ASSESSMENT
[FreeTextEntry1] : 61 YO man PMH MS and COPD presenting for follow-up.   #Chronic hypercapnic respiratory failure 2/2 COPD and neuromuscular dz (Ms) #COPD on 2.5L oxygen PRN and BIPAP #Ex-smoker of 60 packyear #MS - uses 2.5L NC a few times a week as needed - does not use his albuterol inhaler more than once a month - Patient on RA today, asymptomatic - CT Chest angio 12/24(while inpatient): complete occlusion of the left bronchus causing essentially complete collapse of the left lung. Centrilobular and paraseptal emphysema - repeat CXR Feb 2025 showed improvement - c/w supplemental oxygen use PRN and BIPAP at night - c/w spiriva, symbicort, albuterol, and hypertonic saline nebulizers - c/w Chest Vibrating Vest - c/w Mucinex, saline - patient does not need any refills at this time  RTC in 3 months for monitoring

## 2025-02-28 NOTE — REVIEW OF SYSTEMS
[SOB on Exertion] : sob on exertion [Cough] : cough [Sputum] : sputum [Fever] : no fever [Hemoptysis] : no hemoptysis [Chest Tightness] : no chest tightness [Pleuritic Pain] : no pleuritic pain [Wheezing] : no wheezing [Chest Discomfort] : no chest discomfort [Palpitations] : no palpitations

## 2025-02-28 NOTE — HISTORY OF PRESENT ILLNESS
Pt arrives by medics from home with c/o bilat top of feet pain, redness, swelling, and blisters. Pt reports no recent injuries. States she woke up with burning pain and swelling and went to work and took her shoes off and then it only got worse. States she had another episode of same symptoms a couple years ago and was given cream and went away. Pt states she took 800mg ibuprofen at 11 last night. States she went to urgent care but couldn't get her prescriptions filled. [TextBox_4] : 59 YO man PMH MS and COPD presenting for follow-up. Patient currently comfortable breathing room air only uses BiPAP at night. No wheezing or coughing today. No dyspnea. Reports the lowest his O2 sat has dropped to is 91%, still requires the NC multiple times a week as needed. Still uses BiPAP at night. No complaints at this time.

## 2025-02-28 NOTE — PHYSICAL EXAM
[No Acute Distress] : no acute distress [No Resp Distress] : no resp distress [TextBox_68] : crackles on left, no wheeze

## 2025-02-28 NOTE — END OF VISIT
[] : Resident [FreeTextEntry3] : Overall stable  On Albuterol, saline nebs  Chest PT with the vest; BIPAP as needed  CXR stable  No recent admission now  Continue same management  On room air today  Overall doing well

## 2025-03-03 NOTE — ASSESSMENT
[FreeTextEntry1] : -Aseptic debridement of all fungal nails.  Patient has pain about the toes secondary to nail pressure and relates improvement with periodic debridement. -discussed treatment options for onychomycosis including oral medications. Patients opts for periodic nail debridement with topical medications -rx ciclopirox 8% -Rx ammonium lactate for dry skin return 3 months   [Verbal] : verbal [Patient] : patient [Good - alert, interested, motivated] : Good - alert, interested, motivated [Demonstrates independently] : demonstrates independently

## 2025-03-03 NOTE — HISTORY OF PRESENT ILLNESS
[Sneakers] : marcelino [Wheelchair] : a wheelchair [FreeTextEntry1] : HPI: 60 yr old male returns for management of nail dystrophy.

## 2025-03-03 NOTE — PHYSICAL EXAM
[General Appearance - Alert] : alert [General Appearance - In No Acute Distress] : in no acute distress [General Appearance - Well Nourished] : well nourished [General Appearance - Well Developed] : well developed [General Appearance - Well-Appearing] : healthy appearing [] : normal voice and communication [1+] : left foot dorsalis pedis 1+ [FreeTextEntry3] : DP/PT diminished [de-identified] : No pain on B/L feet. [FreeTextEntry1] : thick, dystrophic, discolored nails with subungual debris x 10  [Sensation] : the sensory exam was normal to light touch and pinprick [No Focal Deficits] : no focal deficits

## 2025-03-06 NOTE — HISTORY OF PRESENT ILLNESS
[de-identified] : Mr. HEARN is a 60-year-old male with a past medical history of advanced multiple sclerosis presenting for neurosurgical consultation with regards to longstanding cervical and lumbar back pain.   He was diagnosed with multiple sclerosis in 2006. He has been disabled since 2007, currently wheelchair bound with left spastic hemiparesis, dysarthric speech, and chronic neurogenic bladder. He is currently under the care of Dr. Peña.   Pain is present daily, constant in nature. He reports intermittent radicular features to his bilateral lower extremities. Weakness present secondary to MS. He receives home physical therapy 3x a week. No pain management, although patient reports Percocet has been prescribed by his PCP.  IMAGING: MRI Thoracic w/o IVC ([facility] 2/2024): Severe chronic compression deformity of T7. Moderate to severe narrowing of L2. No evidence of spinal canal stenosis. MRI Cervical w/o IVC ([facility] 2/2024): Degenerative changes throughout, mild to moderate spinal canal stenosis at C4-5, C5-6. Stable mild to moderate C3-4 and left C6-7 neural foraminal narrowing. MRI Brain w/o IVC ([facility] 2/2024): Stable since 2023.  Of note, updated imaging will be ordered by his neurologist to monitor MS progression.    PHYSICAL EXAM: Constitutional: Appears uncomfortable 2/2 pain HEENT: Normocephalic Atraumatic Psychiatric: Alert and oriented to all spheres, normal mood Pulmonary: No respiratory distress Language: Dysarthric speech Neurologic: CN II-XII grossly intact Palpation: (+) cervical/lumbar paravertebral tenderness Motor: L hemiparesis, LUE>LLE, wheelchair bound Sensation: Intact to light touch Gait: Unable to assess, wheelchair bound.

## 2025-03-06 NOTE — ASSESSMENT
[FreeTextEntry1] : Mr. HEARN is a 60-year-old male with a past medical history of advanced multiple sclerosis presenting for neurosurgical consultation with regards to longstanding cervical and lumbar back pain. Given the progression of his MS, spinal surgery would not be appropriate for this patient. I will refer to Dr. Gilliam for proper pain management control and he will begin extensive outpatient PT 3x a week.    ATTESTATION: I personally reviewed with the PA/NP, this patient's history and physical exam findings, as documented above. I have discussed the relevant areas of concern, having direct implications to the presenting problems and illnesses, and I have personally examined all pertinent and positive and negative findings, which impact their neurosurgical treatment.    I, Dr. Carcamo, attest that this documentation has been prepared by MS ALEKSANDR Pelayo-BCunder my presence and direction  MS ALEKSANDR Pelayo-BC Nurse Practitioner [facility]  Nash Carcamo MD [facility] , Department of Neurosurgery [facility].

## 2025-03-20 NOTE — PHYSICAL EXAM
[Normal Appearance] : normal appearance [Well Groomed] : well groomed [General Appearance - In No Acute Distress] : no acute distress [] : no respiratory distress [Respiration, Rhythm And Depth] : normal respiratory rhythm and effort [Exaggerated Use Of Accessory Muscles For Inspiration] : no accessory muscle use [Normal Station and Gait] : the gait and station were normal for the patient's age [Oriented To Time, Place, And Person] : oriented to person, place, and time [Affect] : the affect was normal [Mood] : the mood was normal [de-identified] : Patient presents in a wheelchair.

## 2025-03-20 NOTE — HISTORY OF PRESENT ILLNESS
[FreeTextEntry1] : 61 yo male with chronic conditions of MS, wheelchair bound, COPD on home O2, neurogenic bladder managed with CIC is here to follow up for frequent UTI.   Office visit 1/22/24 Patient had a recent visit to the ER after there were issues with his Machado catheter. Apparently it was removed with the balloon still inflated because some trauma to his urethra. There was also concern for retained foreign body/remnant of catheter in his bladder. Pelvic CT at that time was only significant for to moderate size bladder stones measuring approximately 2 cm each. Today patient reports no recent fevers chills gross hematuria, he has been able to intermittently self catheterize without issues although complains of having decreased urinary output from his baseline.  UCx - Proteus and Enterococcus 12/2023  CT Pelvic 1/2024 PELVIC ORGANS: Machado catheter balloon within the urinary bladder lumen. Trace intraluminal air. Calculi within the dependent portion of the urinary bladder. First calculus measures 1.8 x 2.2 cm. Second calculus measures approximately 1.6 x 1.1 cm. A discrete foreign body is not visualized. IMPRESSION: A retained foreign body within the lower pelvis or urinary bladder is not visualized. Machado catheter balloon appropriately positioned within the urinary bladder lumen. Two calculi within the urinary bladder, measuring 1.8 x 2.2 cm and 1.6 x 1.1 cm respectively.  OFFICE VISIT 4/29/24: Pt presents today for follow-up appointment. States he self catheterizes every 4-6 hours without issue, denies suprapubic pain. The patient denies having any fevers, chills, nausea, vomiting, flank/abdominal pain or any irritative voiding symptoms. UA 4/16/24 grossly positive  Office Visit 11/06/2024 Pt reports no new complaints. Had one breakthrough UTI while on daily ppx. Today he denies having any fevers, chills, nausea, vomiting, flank/abdominal pain or any irritative voiding symptoms. He was also concerned about his inability to achieve orgasm. He also has ED but is not interested in erectile function and penile rigidity.   Clinic visit 03/18/2025:  Patient presented to [facility] on 02/12/2025 for shortness of breath and was admitted and treated for pneumonia and COPD exacerbation.  Hospital documents reviewed.  Patient was given discharge instructions to follow-up with his urologist given his history of neurogenic bladder.  Patient continues to do CIC.  He is requesting refills of the antibiotics he was taking for UTI prevention.  Upon reviewing the last Urology note for which the patient attended 11/06/2024, it was documented that he was prescribed TMP-SMX 40 mg-200 mg daily and that methenamine daily was discontinued due to the patient not tolerating the medication.  Macrobid and cephalosporins were not recommended due to the patient's severe pulmonary issues and multiple resistances to cephalosporins, respectively.  The patient was adamant about restarting the medications that Dr. Isaacs had him on for UTI prophylaxis.  On reviewing that last Uro note from Dr. Isaacs in 01/25/2018, the patient at that time had a regimen where he was alternating monthly between medications of TMP--40 mg daily, methenamine 1 g twice daily, and Macrobid 100 mg daily. Patient reports that while he was on this alternating regimen, he was doing well in terms of preventing UTI recurrence.  Patient currently endorses dysuria.

## 2025-03-20 NOTE — ASSESSMENT
[FreeTextEntry1] : 61 yo male with chronic conditions of MS, wheelchair bound, COPD on home O2, neurogenic bladder managed with CIC is here to follow up for frequent UTI.   Sent prescription for TMP-SMX  mg daily.  Restarted methenamine 1g twice daily plus vitamin C with each dose; prescriptions sent to pharmacy.  Did not send Macrobid given patient's pulmonary conditions.  Ordered UA and urine culture to assess for UTI given his current dysuria. Continue CIC.  Follow up in 6 weeks with Dr. Raza.

## 2025-03-20 NOTE — HISTORY OF PRESENT ILLNESS
[FreeTextEntry1] : 61 yo male with chronic conditions of MS, wheelchair bound, COPD on home O2, neurogenic bladder managed with CIC is here to follow up for frequent UTI.   Office visit 1/22/24 Patient had a recent visit to the ER after there were issues with his Machado catheter. Apparently it was removed with the balloon still inflated because some trauma to his urethra. There was also concern for retained foreign body/remnant of catheter in his bladder. Pelvic CT at that time was only significant for to moderate size bladder stones measuring approximately 2 cm each. Today patient reports no recent fevers chills gross hematuria, he has been able to intermittently self catheterize without issues although complains of having decreased urinary output from his baseline.  UCx - Proteus and Enterococcus 12/2023  CT Pelvic 1/2024 PELVIC ORGANS: Machado catheter balloon within the urinary bladder lumen. Trace intraluminal air. Calculi within the dependent portion of the urinary bladder. First calculus measures 1.8 x 2.2 cm. Second calculus measures approximately 1.6 x 1.1 cm. A discrete foreign body is not visualized. IMPRESSION: A retained foreign body within the lower pelvis or urinary bladder is not visualized. Machado catheter balloon appropriately positioned within the urinary bladder lumen. Two calculi within the urinary bladder, measuring 1.8 x 2.2 cm and 1.6 x 1.1 cm respectively.  OFFICE VISIT 4/29/24: Pt presents today for follow-up appointment. States he self catheterizes every 4-6 hours without issue, denies suprapubic pain. The patient denies having any fevers, chills, nausea, vomiting, flank/abdominal pain or any irritative voiding symptoms. UA 4/16/24 grossly positive  Office Visit 11/06/2024 Pt reports no new complaints. Had one breakthrough UTI while on daily ppx. Today he denies having any fevers, chills, nausea, vomiting, flank/abdominal pain or any irritative voiding symptoms. He was also concerned about his inability to achieve orgasm. He also has ED but is not interested in erectile function and penile rigidity.   Clinic visit 03/18/2025:  Patient presented to [facility] on 02/12/2025 for shortness of breath and was admitted and treated for pneumonia and COPD exacerbation.  Hospital documents reviewed.  Patient was given discharge instructions to follow-up with his urologist given his history of neurogenic bladder.  Patient continues to do CIC.  He is requesting refills of the antibiotics he was taking for UTI prevention.  Upon reviewing the last Urology note for which the patient attended 11/06/2024, it was documented that he was prescribed TMP-SMX 40 mg-200 mg daily and that methenamine daily was discontinued due to the patient not tolerating the medication.  Macrobid and cephalosporins were not recommended due to the patient's severe pulmonary issues and multiple resistances to cephalosporins, respectively.  The patient was adamant about restarting the medications that Dr. Isaacs had him on for UTI prophylaxis.  On reviewing that last Uro note from Dr. Isaacs in 01/25/2018, the patient at that time had a regimen where he was alternating monthly between medications of TMP--40 mg daily, methenamine 1 g twice daily, and Macrobid 100 mg daily. Patient reports that while he was on this alternating regimen, he was doing well in terms of preventing UTI recurrence.  Patient currently endorses dysuria.

## 2025-03-20 NOTE — PHYSICAL EXAM
[Normal Appearance] : normal appearance [Well Groomed] : well groomed [General Appearance - In No Acute Distress] : no acute distress [] : no respiratory distress [Respiration, Rhythm And Depth] : normal respiratory rhythm and effort [Exaggerated Use Of Accessory Muscles For Inspiration] : no accessory muscle use [Normal Station and Gait] : the gait and station were normal for the patient's age [Oriented To Time, Place, And Person] : oriented to person, place, and time [Affect] : the affect was normal [Mood] : the mood was normal [de-identified] : Patient presents in a wheelchair.

## 2025-03-20 NOTE — PHYSICAL EXAM
[Normal Appearance] : normal appearance [Well Groomed] : well groomed [General Appearance - In No Acute Distress] : no acute distress [] : no respiratory distress [Respiration, Rhythm And Depth] : normal respiratory rhythm and effort [Exaggerated Use Of Accessory Muscles For Inspiration] : no accessory muscle use [Normal Station and Gait] : the gait and station were normal for the patient's age [Oriented To Time, Place, And Person] : oriented to person, place, and time [Affect] : the affect was normal [Mood] : the mood was normal [de-identified] : Patient presents in a wheelchair.

## 2025-03-21 NOTE — REVIEW OF SYSTEMS
You have a incidental 1 8cm thyroid nodule seen on CT scan  Contact your PCP for follow up appointment for outpatient thyroid ultrasound  [Negative] : Respiratory

## 2025-03-28 NOTE — HISTORY OF PRESENT ILLNESS
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

## 2025-03-28 NOTE — PHYSICAL EXAM
[No Respiratory Distress] : no respiratory distress  [Regular Rhythm] : with a regular rhythm [Soft] : abdomen soft [facility] [de-identified] : crackles L no

## 2025-03-28 NOTE — PLAN
[FreeTextEntry1] : #Cystitis -self cath q4 hours  -new sx of dysuria  -cultrue k.p pansensative prot m. esbl -discussed with Dr. England ID Augmentin 7 days   #JASON - Patient has JASON taking iron at home - Patient reports his last colonoscopy was years ago - sister reports patient had interventions before and had issues with sedation due to pulm issues - GI referral given - Repeat CBC  #Asthma/COPD on 2.5L of o2 at home #Recent Covid infection requiring hospitalization - at baseline - following pulmonary - 2/2023 CT lung cancer screening: no nodules, unilateral L pleural calcifications - c/w BIPAP qHS - c/w spiriva and symbicort - per sister, pt cannot tolerate the vaccination (required hospitalization post covid and flu vaccination in the past) - continue to follow up w/ pulm  #MS Complicated by neurogenic bladder and Chronic Pain - c/w CIC q 4-6hrs - pt had infusion rx with Ocrevus in July 2023 -follows with neurology, [facility] in [facility] planning for infusions # depression - followed by Mental Health - c/w abilify 7mg, Celexa 40 mg q24h, Ambien 12.5mg CR PO qhs, Trazodone 200mg PO qhs prn, melatonin 10mg po qhs - f/u with psychotherapist  #Chronic Back pain #Diffuse pain - Percocet 5mg bid for severe pain PRN for 20 days - pain management referral given  # Endocrine - on testosterone replace therapy   #DI -Follow up with nephrologist - check urine and serum osmolality -Desmopressin 2-2-2 - refills given  #HCM -colonoscopy 5 years ago -FIT 05/22 negative. - GI referral - refills sent - RTC in 1month

## 2025-03-28 NOTE — PHYSICAL EXAM
[No Respiratory Distress] : no respiratory distress  [Regular Rhythm] : with a regular rhythm [Soft] : abdomen soft [facility] [de-identified] : crackles L

## 2025-03-28 NOTE — HISTORY OF PRESENT ILLNESS
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

## 2025-03-28 NOTE — PLAN
[FreeTextEntry1] : #Cystitis -self cath q4 hours  -new sx of dysuria  -cultrue k.p pansensative prot m. esbl -discussed with Dr. England ID Augmentin 7 days   #JASON - Patient has JASON taking iron at home - Patient reports his last colonoscopy was years ago - sister reports patient had interventions before and had issues with sedation due to pulm issues - GI referral given - Repeat CBC  #Asthma/COPD on 2.5L of o2 at home #Recent Covid infection requiring hospitalization - at baseline - following pulmonary - 2/2023 CT lung cancer screening: no nodules, unilateral L pleural calcifications - c/w BIPAP qHS - c/w spiriva and symbicort - per sister, pt cannot tolerate the vaccination (required hospitalization post covid and flu vaccination in the past) - continue to follow up w/ pulm  #MS Complicated by neurogenic bladder and Chronic Pain - c/w CIC q 4-6hrs - pt had infusion rx with Ocrevus in July 2023 -follows with neurology, [facility] in [facility] planning for infusions # depression - followed by Mental Health - c/w abilify 7mg, Celexa 40 mg q24h, Ambien 12.5mg CR PO qhs, Trazodone 200mg PO qhs prn, melatonin 10mg po qhs - f/u with psychotherapist  #Chronic Back pain #Diffuse pain - Percocet 5mg bid for severe pain PRN for 20 days - pain management referral given  # Endocrine - on testosterone replace therapy   #DI -Follow up with nephrologist - check urine and serum osmolality -Desmopressin 2-2-2 - refills given  #HCM -colonoscopy 5 years ago -FIT 05/22 negative. - GI referral - refills sent - RTC in 1month

## 2025-04-10 NOTE — PHYSICAL EXAM
[Spastic] : spastic [0] : 0 [1] : 1 [No] : No [Dysarthria] : dysarthria [Delusions] : delusions [Dysphoric] : dysphoric [Constricted] : constricted [Normal] : good [FreeTextEntry2] : MS. In a wheelchair [FreeTextEntry3] : MS [FreeTextEntry4] : MS [FreeTextEntry1] : disabled in wheelchair [FreeTextEntry5] : severe impairment [FreeTextEntry8] : so-so [de-identified] : grossly intact [de-identified] : grossly intact [de-identified] : grossly intact [de-identified] : grossly intact

## 2025-04-10 NOTE — HISTORY OF PRESENT ILLNESS
[No] : no [Yes] : yes [Yes > 3 months ago] : yes, > 3 months ago [None Known] : none known [Chronic pain or acute medical issues] : chronic pain or acute medical issues [Recent loss] : recent loss [Responsibility to family or others] : responsibility to family or others [Identifies reasons for living] : identifies reasons for living [Future oriented] : future oriented [Supportive social network or family] : supportive social network or family [Fear of death or dying due to pain/suffering] : fear of death or dying due to pain/suffering [High spirituality] : high spirituality [Positive therapeutic relationships] : positive therapeutic relationships [Ability to cope with stress] : ability to cope with stress [TextBox_32] : Patient does not appear to have any imminent threat to self and/or others at this time evidenced by lack of active suicidal ideation, intent, plan, future orientation, willing to seek support in times of crisis, residential stability, improved sleep, medication compliance, spirituality and Congregation. Safety plan reviewed. Chronic risk of chronic medical issues and past SA mitigated by above.  [FreeTextEntry1] : Pt came in person to the office but the provider works from home today. So, we conducted a phone session. Pt says that he feels a bit better. However, continues to feel weak. Pt asks why he feels this way. Education was provided (Pt was recently d/c from the hospital, where he was admitted for COVID 19 pneumonia).  He spoke about his medical problems and his hospitalization. Feels better now, though has some SOB and weakness. Can't sleep without ambien. Pt was comforted. Supportive therapy was provided  Pt needed a lot of reassurance. Reports being compliant. Says that he sleeps better after he takes neurontin and ambien qhs, yet it takes several hours to fall asleep.  Pt has his chronic passive suicidal thoughts, (has had them for years), but denies active SI, intent or plan and says that they are at baseline..   He doesn't attend [employer] [facility] [employer] once weekly now because of his sickness, but maintains phone contact with his mother, brother who live on [place]. Spoke about his david and God. Also, about "evil in this world". Pt has very eccentric believes (speaks a lot about black magic, exorcism, evil spirit, spells, etc). Pt has had these believes for many years (delusional disorder vs schizotypal PD?). Denies other sxs of acute exacerbation of depressive sxs. Denies thoughts to harm himself/others or end his life and engages in safety planning of calling writer, therapist, family, or 911 if he has thoughts of ending his life.  Denies sx of psychosis, leo.  Pt adamantly denies active SI/SP/intent/HI

## 2025-04-10 NOTE — REASON FOR VISIT
[Telephone (audio) - Individual/Group] : This telephonic visit was provided via audio only technology. [Patient preference] : Patient preference. [Medical Office: ([facility])___] : The provider was located at the medical office in [unfilled]. [Home] : The patient, [unfilled], was located at home, [unfilled], at the time of the visit. [Verbal consent obtained from patient/other participant(s)] : Verbal consent for telehealth/telephonic services obtained from patient/other participant(s) [Patient] : Patient [Follow-Up Visit] : a follow-up visit [Other: _____] : [unfilled] [FreeTextEntry1] : medication management

## 2025-04-10 NOTE — HISTORY OF PRESENT ILLNESS
[No] : no [Yes] : yes [Yes > 3 months ago] : yes, > 3 months ago [None Known] : none known [Chronic pain or acute medical issues] : chronic pain or acute medical issues [Recent loss] : recent loss [Responsibility to family or others] : responsibility to family or others [Identifies reasons for living] : identifies reasons for living [Future oriented] : future oriented [Supportive social network or family] : supportive social network or family [Fear of death or dying due to pain/suffering] : fear of death or dying due to pain/suffering [High spirituality] : high spirituality [Positive therapeutic relationships] : positive therapeutic relationships [Ability to cope with stress] : ability to cope with stress [TextBox_32] : Patient does not appear to have any imminent threat to self and/or others at this time evidenced by lack of active suicidal ideation, intent, plan, future orientation, willing to seek support in times of crisis, residential stability, improved sleep, medication compliance, spirituality and Confucianism. Safety plan reviewed. Chronic risk of chronic medical issues and past SA mitigated by above.  [FreeTextEntry1] : Pt came in person to the office but the provider works from home today. So, we conducted a phone session. Pt says that he feels a bit better. However, continues to feel weak. Pt asks why he feels this way. Education was provided (Pt was recently d/c from the hospital, where he was admitted for COVID 19 pneumonia).  He spoke about his medical problems and his hospitalization. Feels better now, though has some SOB and weakness. Can't sleep without ambien. Pt was comforted. Supportive therapy was provided  Pt needed a lot of reassurance. Reports being compliant. Says that he sleeps better after he takes neurontin and ambien qhs, yet it takes several hours to fall asleep.  Pt has his chronic passive suicidal thoughts, (has had them for years), but denies active SI, intent or plan and says that they are at baseline..   He doesn't attend [employer] [facility] [employer] once weekly now because of his sickness, but maintains phone contact with his mother, brother who live on [place]. Spoke about his david and God. Also, about "evil in this world". Pt has very eccentric believes (speaks a lot about black magic, exorcism, evil spirit, spells, etc). Pt has had these believes for many years (delusional disorder vs schizotypal PD?). Denies other sxs of acute exacerbation of depressive sxs. Denies thoughts to harm himself/others or end his life and engages in safety planning of calling writer, therapist, family, or 911 if he has thoughts of ending his life.  Denies sx of psychosis, leo.  Pt adamantly denies active SI/SP/intent/HI

## 2025-04-10 NOTE — REASON FOR VISIT
[Telephone (audio) - Individual/Group] : This telephonic visit was provided via audio only technology. [Patient preference] : Patient preference. [Medical Office: ([facility])___] : The provider was located at the medical office in [unfilled]. [Home] : The patient, [unfilled], was located at home, [unfilled], at the time of the visit. [Verbal consent obtained from patient/other participant(s)] : Verbal consent for telehealth/telephonic services obtained from patient/other participant(s) [Patient] : Patient [Follow-Up Visit] : a follow-up visit [Other: _____] : [unfilled] [FreeTextEntry1] : medication management

## 2025-04-10 NOTE — PHYSICAL EXAM
[Spastic] : spastic [0] : 0 [1] : 1 [No] : No [Dysarthria] : dysarthria [Delusions] : delusions [Dysphoric] : dysphoric [Constricted] : constricted [Normal] : good [FreeTextEntry2] : MS. In a wheelchair [FreeTextEntry3] : MS [FreeTextEntry4] : MS [FreeTextEntry1] : disabled in wheelchair [FreeTextEntry5] : severe impairment [FreeTextEntry8] : so-so [de-identified] : grossly intact [de-identified] : grossly intact [de-identified] : grossly intact [de-identified] : grossly intact

## 2025-04-10 NOTE — PHYSICAL EXAM
[Spastic] : spastic [0] : 0 [1] : 1 [No] : No [Dysarthria] : dysarthria [Delusions] : delusions [Dysphoric] : dysphoric [Constricted] : constricted [Normal] : good [FreeTextEntry2] : MS. In a wheelchair [FreeTextEntry3] : MS [FreeTextEntry4] : MS [FreeTextEntry1] : disabled in wheelchair [FreeTextEntry5] : severe impairment [FreeTextEntry8] : so-so [de-identified] : grossly intact [de-identified] : grossly intact [de-identified] : grossly intact [de-identified] : grossly intact

## 2025-04-14 NOTE — DISCUSSION/SUMMARY
[Plan Review] : Plan Review [Adherent to treatment recommendations] : adherent to treatment recommendations [Motivated to participate in treatment] : motivated to participate in treatment [Housing stability] : housing stability [English fluency] : English fluency [Access to safe outdoor spaces] : access to safe outdoor spaces [Continued - Progress made] : Continued - Progress made: [every ___ weeks] : every [unfilled] weeks [Improvement in symptoms as evidenced by:] : Improvement in symptoms as evidenced by: [None - Reason others did not participate:] : None - Reason others did not participate:  [Yes] : Yes [Date of Last Physical Exam: _____] : Date of Last Physical Exam: [unfilled] [FreeTextEntry2] : 01/02/2026 [de-identified] : more stable mood., shorter and less frequent episodes of depression [de-identified] : medical health maintenance [de-identified] : indefenitely [de-identified] : n/a [Date of Last Annual Labs: _____] : Date of Last Annual Labs: [unfilled] [Annual Review of Systems Completed?] : Annual Review of Systems Completed: Yes [Tobacco Screening Completed?] : Tobacco Screening Completed: Yes [Date of Last HbgA1c: _____] : Date of Last HbgA1c: [unfilled] [Potential impact of patient’s physical health conditions on psychiatric care?] : Potential impact of patient's physical health conditions on psychiatric care: Yes [Does patient require any additional health services or referrals?] : Does patient require any additional health services or referrals: Yes [FreeTextEntry1] : pt is under care of many specialists

## 2025-04-14 NOTE — DISCUSSION/SUMMARY
[Plan Review] : Plan Review [Adherent to treatment recommendations] : adherent to treatment recommendations [Motivated to participate in treatment] : motivated to participate in treatment [Housing stability] : housing stability [English fluency] : English fluency [Access to safe outdoor spaces] : access to safe outdoor spaces [Continued - Progress made] : Continued - Progress made: [every ___ weeks] : every [unfilled] weeks [Improvement in symptoms as evidenced by:] : Improvement in symptoms as evidenced by: [None - Reason others did not participate:] : None - Reason others did not participate:  [Yes] : Yes [Date of Last Physical Exam: _____] : Date of Last Physical Exam: [unfilled] [FreeTextEntry2] : 01/02/2026 [de-identified] : more stable mood., shorter and less frequent episodes of depression [de-identified] : medical health maintenance [de-identified] : indefenitely [de-identified] : n/a [Date of Last Annual Labs: _____] : Date of Last Annual Labs: [unfilled] [Annual Review of Systems Completed?] : Annual Review of Systems Completed: Yes [Tobacco Screening Completed?] : Tobacco Screening Completed: Yes [Date of Last HbgA1c: _____] : Date of Last HbgA1c: [unfilled] [Potential impact of patient’s physical health conditions on psychiatric care?] : Potential impact of patient's physical health conditions on psychiatric care: Yes [Does patient require any additional health services or referrals?] : Does patient require any additional health services or referrals: Yes [FreeTextEntry1] : pt is under care of many specialists

## 2025-04-14 NOTE — DISCUSSION/SUMMARY
[Plan Review] : Plan Review [Adherent to treatment recommendations] : adherent to treatment recommendations [Motivated to participate in treatment] : motivated to participate in treatment [Housing stability] : housing stability [English fluency] : English fluency [Access to safe outdoor spaces] : access to safe outdoor spaces [Continued - Progress made] : Continued - Progress made: [every ___ weeks] : every [unfilled] weeks [Improvement in symptoms as evidenced by:] : Improvement in symptoms as evidenced by: [None - Reason others did not participate:] : None - Reason others did not participate:  [Yes] : Yes [Date of Last Physical Exam: _____] : Date of Last Physical Exam: [unfilled] [FreeTextEntry2] : 01/02/2026 [de-identified] : more stable mood., shorter and less frequent episodes of depression [de-identified] : medical health maintenance [de-identified] : indefenitely [de-identified] : n/a [Date of Last Annual Labs: _____] : Date of Last Annual Labs: [unfilled] [Annual Review of Systems Completed?] : Annual Review of Systems Completed: Yes [Tobacco Screening Completed?] : Tobacco Screening Completed: Yes [Date of Last HbgA1c: _____] : Date of Last HbgA1c: [unfilled] [Potential impact of patient’s physical health conditions on psychiatric care?] : Potential impact of patient's physical health conditions on psychiatric care: Yes [Does patient require any additional health services or referrals?] : Does patient require any additional health services or referrals: Yes [FreeTextEntry1] : pt is under care of many specialists

## 2025-04-21 NOTE — ASSESSMENT
[FreeTextEntry1] : #Cystitis - Resolved  #JASON - Patient has JASON taking iron at home - Patient reports his last colonoscopy was years ago - sister reports patient had interventions before and had issues with sedation due to pulm issues - GI referral given - Repeat CBC  #Asthma/COPD on 2.5L of o2 at home #Recent Covid infection requiring hospitalization - at baseline - following pulmonary - 2/2023 CT lung cancer screening: no nodules, unilateral L pleural calcifications - c/w BIPAP qHS - c/w spiriva and symbicort  #MS Complicated by neurogenic bladder and Chronic Pain - c/w CIC q 4-6hrs - pt had infusion rx with Ocrevus in July 2023 -follows with neurology, [facility] in [facility] planning for infusions  # depression - followed by Mental Health - c/w abilify 7mg, Celexa 40 mg q24h, Ambien 12.5mg CR PO qhs, Trazodone 200mg PO qhs prn, melatonin 10mg po qhs - f/u with psychotherapist  #Chronic Back pain #Diffuse pain - Percocet 5mg bid for severe pain PRN for 20 days - pain management referral given  # Endocrine - on testosterone replace therapy  #DI -Follow up with nephrologist - check urine and serum osmolality -Desmopressin 2-2-2 - refills given  #HCM -colonoscopy 5 years ago -FIT 05/22 negative. - GI referral - refills sent - RTC in 1month.

## 2025-04-21 NOTE — HISTORY OF PRESENT ILLNESS
[FreeTextEntry1] : Follow up [de-identified] : 60-year-old male PMH MS, COPD on 2.5L at home, LLL atelectasis, central DI on desmopressin, and neurogenic bladder w/ recurrent UTI presents to clinic for f/u eval feels well overall. Cystitis is resolved completely. Patient reports no dysuria.

## 2025-04-21 NOTE — HISTORY OF PRESENT ILLNESS
[FreeTextEntry1] : Follow up [de-identified] : 60-year-old male PMH MS, COPD on 2.5L at home, LLL atelectasis, central DI on desmopressin, and neurogenic bladder w/ recurrent UTI presents to clinic for f/u eval feels well overall. Cystitis is resolved completely. Patient reports no dysuria.

## 2025-04-21 NOTE — ASSESSMENT
[FreeTextEntry1] : #Cystitis - Resolved  #JASON - Patient has JASON taking iron at home - Patient reports his last colonoscopy was years ago - sister reports patient had interventions before and had issues with sedation due to pulm issues - GI referral given - Repeat CBC  #Asthma/COPD on 2.5L of o2 at home #Recent Covid infection requiring hospitalization - at baseline - following pulmonary - 2/2023 CT lung cancer screening: no nodules, unilateral L pleural calcifications - c/w BIPAP qHS - c/w spiriva and symbicort  #MS Complicated by neurogenic bladder and Chronic Pain - c/w CIC q 4-6hrs - pt had infusion rx with Ocrevus in July 2023 -follows with neurology, [facility] in [facility] planning for infusions  # depression - followed by Mental Health - c/w abilify 7mg, Celexa 40 mg q24h, Ambien 12.5mg CR PO qhs, Trazodone 200mg PO qhs prn, melatonin 10mg po qhs - f/u with psychotherapist  #Chronic Back pain #Diffuse pain - Percocet 5mg bid for severe pain PRN for 20 days - pain management referral given  # Endocrine - on testosterone replace therapy  #DI -Follow up with nephrologist - check urine and serum osmolality -Desmopressin 2-2-2 - refills given  #HCM -colonoscopy 5 years ago -FIT 05/22 negative. - GI referral - refills sent - RTC in 1month.

## 2025-04-21 NOTE — ASSESSMENT
[FreeTextEntry1] : #Cystitis - Resolved  #JASON - Patient has JASON taking iron at home - Patient reports his last colonoscopy was years ago - sister reports patient had interventions before and had issues with sedation due to pulm issues - GI referral given - Repeat CBC  #Asthma/COPD on 2.5L of o2 at home #Recent Covid infection requiring hospitalization - at baseline - following pulmonary - 2/2023 CT lung cancer screening: no nodules, unilateral L pleural calcifications - c/w BIPAP qHS - c/w spiriva and symbicort  #MS Complicated by neurogenic bladder and Chronic Pain - c/w CIC q 4-6hrs - pt had infusion rx with Ocrevus in July 2023 -follows with neurology, [facility] in [facility] planning for infusions  # depression - followed by Mental Health - c/w abilify 7mg, Celexa 40 mg q24h, Ambien 12.5mg CR PO qhs, Trazodone 200mg PO qhs prn, melatonin 10mg po qhs - f/u with psychotherapist  #Chronic Back pain #Diffuse pain - Percocet 5mg bid for severe pain PRN for 20 days - pain management referral given  # Endocrine - on testosterone replace therapy  #DI -Follow up with nephrologist - check urine and serum osmolality -Desmopressin 2-2-2 - refills given  #HCM -colonoscopy 5 years ago -FIT 05/22 negative. - GI referral - refills sent - RTC in 1month.

## 2025-04-21 NOTE — HISTORY OF PRESENT ILLNESS
[FreeTextEntry1] : Follow up [de-identified] : 60-year-old male PMH MS, COPD on 2.5L at home, LLL atelectasis, central DI on desmopressin, and neurogenic bladder w/ recurrent UTI presents to clinic for f/u eval feels well overall. Cystitis is resolved completely. Patient reports no dysuria.

## 2025-04-30 NOTE — ASSESSMENT
[FreeTextEntry1] : 61 yo male with chronic conditions of MS, wheelchair bound, COPD on home O2, neurogenic bladder managed with CIC is here to follow up for frequent UTI.  #Bladder stones - CTAP (1/3/25): KIDNEYS/URETERS: No renal stones or hydronephrosis. BLADDER: Machado catheter within urinary bladder lumen. Multiple layering calculi within the urinary bladder, largest measuring up to 2.2 cm - Deferred cystolitholapaxy at this time and also poor surgical candidate. Discussed increased risk of infection with bladder stones  #Neurogenic bladder - Continue CIC  #Recurrent UTIs - Pyridium 2 days - UA/UCx - Continue follow up with Infectious Disease  Follow up in 4 months

## 2025-04-30 NOTE — PLAN
[TextEntry] : The patient was seen and examined with the PA/NP/RN/Resident. I agree with the assessment/plan and made any necessary adjustments.  The submitted E/M billing level for this visit reflects the total time spent on the day of the visit including face-to-face time spent with the patient, non-face-to-face review of medical records and relevant information, documentation, and asynchronous communication with the patient after a visit via phone, email, or patients EHR portal after the visit. The medical records reviewed are either scanned into the chart or reviewed with the patient using a patient's electronic medical records portal for patients with records not available to [facility] via electronic transmission platforms from other institutions and labs.   I have reviewed and verified information regarding the chief complaint and history recorded by the ancillary staff and/or the patient. I have independently reviewed and interpreted tests performed by other physicians and facilities as necessary. I have discussed with the patient differential diagnosis, reason for auxiliary tests if ordered, risks, benefits, alternatives, and complications of each form of therapy were discussed. Social determinants of disease were assessed and necessary measures implemented.   Time spent counseling and performing coordination of care was also included in determining the appropriate EM billing level.

## 2025-04-30 NOTE — HISTORY OF PRESENT ILLNESS
[FreeTextEntry1] : 59 yo male with chronic conditions of MS, wheelchair bound, COPD on home O2, neurogenic bladder managed with CIC is here to follow up for frequent UTI.  Office visit 1/22/24 Patient had a recent visit to the ER after there were issues with his Machado catheter. Apparently it was removed with the balloon still inflated because some trauma to his urethra. There was also concern for retained foreign body/remnant of catheter in his bladder. Pelvic CT at that time was only significant for to moderate size bladder stones measuring approximately 2 cm each. Today patient reports no recent fevers chills gross hematuria, he has been able to intermittently self catheterize without issues although complains of having decreased urinary output from his baseline.  UCx - Proteus and Enterococcus 12/2023  CT Pelvic 1/2024 PELVIC ORGANS: Machado catheter balloon within the urinary bladder lumen. Trace intraluminal air. Calculi within the dependent portion of the urinary bladder. First calculus measures 1.8 x 2.2 cm. Second calculus measures approximately 1.6 x 1.1 cm. A discrete foreign body is not visualized. IMPRESSION: A retained foreign body within the lower pelvis or urinary bladder is not visualized. Machado catheter balloon appropriately positioned within the urinary bladder lumen. Two calculi within the urinary bladder, measuring 1.8 x 2.2 cm and 1.6 x 1.1 cm respectively.  OFFICE VISIT 4/29/24: Pt presents today for follow-up appointment. States he self catheterizes every 4-6 hours without issue, denies suprapubic pain. The patient denies having any fevers, chills, nausea, vomiting, flank/abdominal pain or any irritative voiding symptoms. UA 4/16/24 grossly positive  Office Visit 11/06/2024 Pt reports no new complaints. Had one breakthrough UTI while on daily ppx. Today he denies having any fevers, chills, nausea, vomiting, flank/abdominal pain or any irritative voiding symptoms. He was also concerned about his inability to achieve orgasm. He also has ED but is not interested in erectile function and penile rigidity.   Clinic visit 03/18/2025: Patient presented to [facility] on 02/12/2025 for shortness of breath and was admitted and treated for pneumonia and COPD exacerbation. Hospital documents reviewed. Patient was given discharge instructions to follow-up with his urologist given his history of neurogenic bladder.  Patient continues to do CIC. He is requesting refills of the antibiotics he was taking for UTI prevention. Upon reviewing the last Urology note for which the patient attended 11/06/2024, it was documented that he was prescribed TMP-SMX 40 mg-200 mg daily and that methenamine daily was discontinued due to the patient not tolerating the medication. Macrobid and cephalosporins were not recommended due to the patient's severe pulmonary issues and multiple resistances to cephalosporins, respectively. The patient was adamant about restarting the medications that Dr. Isaacs had him on for UTI prophylaxis. On reviewing that last Uro note from Dr. Isaacs in 01/25/2018, the patient at that time had a regimen where he was alternating monthly between medications of TMP--40 mg daily, methenamine 1 g twice daily, and Macrobid 100 mg daily. Patient reports that while he was on this alternating regimen, he was doing well in terms of preventing UTI recurrence. Patient currently endorses dysuria.  Office Visit 4/30/25: Pt reports dysuria x 1 week. Pt reports CIC every 1.5-2 hours draining about 150-200 cc each time. Pt was seen by ID, given IV abx and currently taking Bactrim as per sister, Lilly, over the phone. Denies fevers, chills.

## 2025-05-20 NOTE — HISTORY OF PRESENT ILLNESS
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

## 2025-05-20 NOTE — ASSESSMENT
[FreeTextEntry1] : # Severe Constipation x 3 days - fleets mineral oil enema  - suggest bowel prep lactulose daily - if does not work will need to come to ER   #JASON - Patient has JASON taking iron at home - Patient reports his last colonoscopy was years ago - GI referral pending - follow CBC  #Asthma/COPD on 2L of o2 at home - following pulmonary - 2/2023 CT lung cancer screening: no nodules, unilateral L pleural calcifications - c/w BIPAP qHS - c/w spiriva and symbicort  #MS Complicated by neurogenic bladder and Chronic Pain - c/w CIC q 4-6hrs - now off of Tx, suggest close follow up with Neurology.  Family member help pt make medical appointments   # depression - followed by Mental Health - c/w abilify 7mg, Celexa 40 mg q24h, Ambien 12.5mg CR PO qhs, Trazodone 200mg PO qhs prn, melatonin 10mg po qhs - f/u with psychotherapist  #Chronic Back pain #Diffuse pain - Percocet 5mg bid for severe pain PRN for 20 days - pain management referral given  # Endocrine - on testosterone replace therapy - helps with functional capacity given advanced MS  #DI -Follow up with nephrologist - check urine and serum osmolality -Desmopressin 2-2-2

## 2025-05-20 NOTE — HISTORY OF PRESENT ILLNESS
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

## 2025-05-23 NOTE — REASON FOR VISIT
[Verbal consent obtained from patient/other participant(s)] : Verbal consent for telehealth/telephonic services obtained from patient/other participant(s) [Patient] : Patient [Follow-Up Visit] : a follow-up visit [Other: _____] : [unfilled] [Participant(s) identity verified] : Participant(s) identity verified. [FreeTextEntry1] : medication management

## 2025-05-23 NOTE — DISCUSSION/SUMMARY
[FreeTextEntry1] : Pt came in person for his F/u. Pt gained a lot of weight. Feels ugly. Objectively, he looks a bit healthier. His speech is more coherent. Pt is afraid that ambien will stop working one day. This matter was discussed. Pt was comforted and educated. Pt spoke a lot about his frustration regarding medications for MS. He still doesn't get his old medication, which was d/c.  Pt needed a lot of reassurance. Reports being compliant. Says that he sleeps better after he takes neurontin and ambien qhs, yet it takes several hours to fall asleep.  Pt has his chronic passive suicidal thoughts, (has had them for years), but denies active SI, intent or plan and says that they are at baseline..   He doesn't attend [employer] once weekly now because of his sickness, but maintains phone contact with his mother, brother who live on [address] [place]. Spoke about his david and God. Also, about "evil in this world". Pt has very eccentric believes (speaks a lot about black magic, exorcism, evil spirit, spells, etc). Pt has had these believes for many years (delusional disorder vs schizotypal PD?). Denies other sxs of acute exacerbation of depressive sxs. Denies thoughts to harm himself/others or end his life and engages in safety planning of calling writer, therapist, family, or 911 if he has thoughts of ending his life.  Denies sx of psychosis, leo.  Pt adamantly denies active SI/SP/intent/HI  Pt is a 61yo M, ,  ex wife (she took away their house and left him with nothing. He calls her evil. He got  in 2005 and was Dxd with MS in 2006). has MS (since 2006), has a daughter (20yo, his other daughter, who was 29, was killed in MVA in 2021) and ex wife in [place], domiciled alone (has a HA 8h 7 days a week), SSD, with long h/o mental illness, several prior IPP(started before he was Dxd with MS), no prior SA, (but with long h/o passive SI), who was seen by dr. Estrella. 1. Next appt in 1m in person 2. Continue meds(doesn't want to change anything). Continue celexa for depression and anxiety, abilify for psychosis and as augmentation for depression, trazodone and ambien for insomnia. I increased trazodone back to 300mg qhs (his insurance doesn't cover 3 tabs. I gave 150mg 2 tabs). I also told him to try to take 2 tabs of neurontin at night (he doesn't sleep well) 3. Needs PA for ambien q 6m at some point (I called last time and it didn't need PA) 4. I spoke to his sister Lilly on the phone in pt's presence to discuss his wish for tysapri. (he is on ocrevus. His next injection is in February)

## 2025-05-23 NOTE — HISTORY OF PRESENT ILLNESS
[No] : no [Yes] : yes [Yes > 3 months ago] : yes, > 3 months ago [None Known] : none known [Chronic pain or acute medical issues] : chronic pain or acute medical issues [Recent loss] : recent loss [Responsibility to family or others] : responsibility to family or others [Identifies reasons for living] : identifies reasons for living [Future oriented] : future oriented [Supportive social network or family] : supportive social network or family [Fear of death or dying due to pain/suffering] : fear of death or dying due to pain/suffering [High spirituality] : high spirituality [Positive therapeutic relationships] : positive therapeutic relationships [Ability to cope with stress] : ability to cope with stress [TextBox_32] : Patient does not appear to have any imminent threat to self and/or others at this time evidenced by lack of active suicidal ideation, intent, plan, future orientation, willing to seek support in times of crisis, residential stability, improved sleep, medication compliance, spirituality and Zoroastrianism. Safety plan reviewed. Chronic risk of chronic medical issues and past SA mitigated by above.  [FreeTextEntry1] : Pt came in person for his F/u. Pt gained a lot of weight. Feels ugly. Objectively, he looks a bit healthier. His speech is more coherent. Pt is afraid that ambien will stop working one day. This matter was discussed. Pt was comforted and educated. Pt spoke a lot about his frustration regarding medications for MS. He still doesn't get his old medication, which was d/c.  Pt needed a lot of reassurance. Reports being compliant. Says that he sleeps better after he takes neurontin and ambien qhs, yet it takes several hours to fall asleep.  Pt has his chronic passive suicidal thoughts, (has had them for years), but denies active SI, intent or plan and says that they are at baseline..   He doesn't attend [employer] and [employer] once weekly now because of his sickness, but maintains phone contact with his mother, brother who live on [place]. Spoke about his david and God. Also, about "evil in this world". Pt has very eccentric believes (speaks a lot about black magic, exorcism, evil spirit, spells, etc). Pt has had these believes for many years (delusional disorder vs schizotypal PD?). Denies other sxs of acute exacerbation of depressive sxs. Denies thoughts to harm himself/others or end his life and engages in safety planning of calling writer, therapist, family, or 911 if he has thoughts of ending his life.  Denies sx of psychosis, leo.  Pt adamantly denies active SI/SP/intent/HI

## 2025-05-23 NOTE — PHYSICAL EXAM
[Spastic] : spastic [0] : 0 [1] : 1 [No] : No [Dysarthria] : dysarthria [Delusions] : delusions [Dysphoric] : dysphoric [Constricted] : constricted [Normal] : good [FreeTextEntry2] : MS. In a wheelchair [FreeTextEntry3] : MS [FreeTextEntry4] : MS [FreeTextEntry1] : disabled in wheelchair [FreeTextEntry5] : severe impairment [FreeTextEntry8] : so-so [de-identified] : grossly intact [de-identified] : grossly intact [de-identified] : grossly intact [de-identified] : grossly intact

## 2025-05-30 NOTE — ASSESSMENT
[FreeTextEntry1] :   #Chronic hypercapnic respiratory failure 2/2 COPD and neuromuscular disease (Ms) #COPD on 2.5L oxygen PRN and BIPAP #Ex-smoker of 60 packyear #MS - uses 2.5L NC  as needed - Patient on RA today - CT Chest angio 12/24(while inpatient): New secretions within the trachea and right bronchus. New complete occlusion of the left bronchus causing essentially complete collapse of the left lung. Similar-appearing groundglass centrilobular nodularity, likely postinfectious. Centrilobular and  paraseptal emphysema. Additional areas of right-sided subsegmental atelectasis and scarring. - repeat CXR Feb 2025 showed improvement Plan - c/w supplemental oxygen use PRN and BIPAP at night - c/w spiriva, symbicort, albuterol, mucomist, and hypertonic saline nebulizers - c/w Chest Vibrating Vest - c/w Mucinex - Get CXR today for follow up - Discussed with patient if he feels like he needs to be admitted to hospital, he declined.  - Informed about symptoms to look for that would necessitate the need for hospital admission which include but not limited to fever, worsening SOB, increased cough, worsening mentation.. - Follow up in 3 months - Pulmonary meds renewed

## 2025-05-30 NOTE — HISTORY OF PRESENT ILLNESS
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  Patient currently comfortable breathing room air, uses BiPAP at night. No wheezing or coughing today. No dyspnea.  No acute complaints at this time. Seems more winded than his usual. No fever, no chills, no increased sputum production. Compliant with meds and inhalers.  CTA chest December 2024: LUNGS AND LARGE AIRWAYS: New secretions within the trachea and right bronchus. New complete occlusion of the left bronchus causing essentially complete collapse of the left lung. Similar-appearing groundglass centrilobular nodularity, likely postinfectious. Centrilobular and  paraseptal emphysema. Additional areas of right-sided subsegmental atelectasis and scarring.

## 2025-05-30 NOTE — END OF VISIT
[] : Resident [FreeTextEntry3] : CXR given  Meds renewed Decreased air entry bilaterally  If any worsening in symptoms advised to come to the ED

## 2025-05-30 NOTE — PHYSICAL EXAM
[No Acute Distress] : no acute distress [No Resp Distress] : no resp distress [No Acc Muscle Use] : no acc muscle use [TextBox_68] : decreased air entry bilaterally

## 2025-06-27 NOTE — HISTORY OF PRESENT ILLNESS
[No] : no [Yes] : yes [Yes > 3 months ago] : yes, > 3 months ago [None Known] : none known [Chronic pain or acute medical issues] : chronic pain or acute medical issues [Recent loss] : recent loss [Responsibility to family or others] : responsibility to family or others [Identifies reasons for living] : identifies reasons for living [Future oriented] : future oriented [Supportive social network or family] : supportive social network or family [Fear of death or dying due to pain/suffering] : fear of death or dying due to pain/suffering [High spirituality] : high spirituality [Positive therapeutic relationships] : positive therapeutic relationships [Ability to cope with stress] : ability to cope with stress [TextBox_32] : Patient does not appear to have any imminent threat to self and/or others at this time evidenced by lack of active suicidal ideation, intent, plan, future orientation, willing to seek support in times of crisis, residential stability, improved sleep, medication compliance, spirituality and Druze. Safety plan reviewed. Chronic risk of chronic medical issues and past SA mitigated by above.  [FreeTextEntry1] : Pt came in person for his F/u. In order to protect the pt and prevent him from getting sick we placed him in a different room and set up solo system since the writer has residual symptoms of a cold. The pt has severely compromised immunity. Pt lost a bit of weight. Objectively, he looks a bit healthier. His speech is more coherent. Pt is afraid that ambien will stop working one day. This matter was discussed. Pt was comforted and educated. Reports being compliant. Says that he sleeps better after he takes neurontin and ambien qhs, yet it takes several hours to fall asleep.  Pt has his chronic passive suicidal thoughts, (has had them for years), but denies active SI, intent or plan and says that they are at baseline..   He doesn't attend [employer] and [employer] once weekly now because of his sickness, but maintains phone contact with his mother, brother who live on [place].   THERAPY 22MIN-Spoke about his david and God. Also, about "evil in this world". Pt has very eccentric believes (speaks a lot about black magic, exorcism, evil spirit, spells, etc). Pt has had these believes for many years (delusional disorder vs schizotypal PD?). Denies other sxs of acute exacerbation of depressive sxs. Denies thoughts to harm himself/others or end his life and engages in safety planning of calling writer, therapist, family, or 911 if he has thoughts of ending his life.  Denies sx of psychosis, leo.  Pt adamantly denies active SI/SP/intent/HI

## 2025-06-27 NOTE — DISCUSSION/SUMMARY
[FreeTextEntry1] : Pt came in person for his F/u. In order to protect the pt and prevent him from getting sick we placed him in a different room and set up solo system since the writer has residual symptoms of a cold. The pt has severely compromised immunity. Pt lost a bit of weight. Objectively, he looks a bit healthier. His speech is more coherent. Pt is afraid that ambien will stop working one day. This matter was discussed. Pt was comforted and educated. Reports being compliant. Says that he sleeps better after he takes neurontin and ambien qhs, yet it takes several hours to fall asleep.  Pt has his chronic passive suicidal thoughts, (has had them for years), but denies active SI, intent or plan and says that they are at baseline..   He doesn't attend [employer] and [employer] once weekly now because of his sickness, but maintains phone contact with his mother, brother who live on [place].   THERAPY 22MIN-Spoke about his david and God. Also, about "evil in this world". Pt has very eccentric believes (speaks a lot about black magic, exorcism, evil spirit, spells, etc). Pt has had these believes for many years (delusional disorder vs schizotypal PD?). Denies other sxs of acute exacerbation of depressive sxs. Denies thoughts to harm himself/others or end his life and engages in safety planning of calling writer, therapist, family, or 911 if he has thoughts of ending his life.  Denies sx of psychosis, leo.  Pt adamantly denies active SI/SP/intent/HI  Pt is a 59yo M, ,  ex wife (she took away their house and left him with nothing. He calls her evil. He got  in 2005 and was Dxd with MS in 2006). has MS (since 2006), has a daughter (22yo, his other daughter, who was 29, was killed in MVA in 2021) and ex wife in [place], domiciled alone (has a HA 8h 7 days a week), SSD, with long h/o mental illness, several prior IPP(started before he was Dxd with MS), no prior SA, (but with long h/o passive SI), who was seen by dr. Estrella. 1. Next appt in 1m in person 2. Continue meds(doesn't want to change anything). Continue celexa for depression and anxiety, abilify for psychosis and as augmentation for depression, trazodone and ambien for insomnia. I increased trazodone back to 300mg qhs (his insurance doesn't cover 3 tabs. I gave 150mg 2 tabs). I also told him to try to take 2 tabs of neurontin at night (he doesn't sleep well) 3. Needs PA for ambien q 6m at some point (I called last time and it didn't need PA) 4. In the past I spoke to his sister Lilly on the phone in pt's presence to discuss his wish for tysapri. (he is on ocrevus. His next injection is in February)

## 2025-06-27 NOTE — PHYSICAL EXAM
[Spastic] : spastic [0] : 0 [1] : 1 [No] : No [Dysarthria] : dysarthria [Delusions] : delusions [Dysphoric] : dysphoric [Constricted] : constricted [Normal] : good [FreeTextEntry2] : MS. In a wheelchair [FreeTextEntry3] : MS [FreeTextEntry4] : MS [FreeTextEntry1] : disabled in wheelchair [FreeTextEntry5] : severe impairment [FreeTextEntry8] : so-so [de-identified] : grossly intact [de-identified] : grossly intact [de-identified] : grossly intact [de-identified] : grossly intact

## 2025-06-27 NOTE — REASON FOR VISIT
[Participant(s) identity verified] : Participant(s) identity verified. [Verbal consent obtained from patient/other participant(s)] : Verbal consent for telehealth/telephonic services obtained from patient/other participant(s) [Patient] : Patient [Follow-Up Visit] : a follow-up visit [Other: _____] : [unfilled] [FreeTextEntry1] : medication management

## 2025-06-27 NOTE — PHYSICAL EXAM
[Spastic] : spastic [0] : 0 [1] : 1 [No] : No [Dysarthria] : dysarthria [Delusions] : delusions [Dysphoric] : dysphoric [Constricted] : constricted [Normal] : good [FreeTextEntry2] : MS. In a wheelchair [FreeTextEntry3] : MS [FreeTextEntry4] : MS [FreeTextEntry1] : disabled in wheelchair [FreeTextEntry5] : severe impairment [FreeTextEntry8] : so-so [de-identified] : grossly intact [de-identified] : grossly intact [de-identified] : grossly intact [de-identified] : grossly intact

## 2025-06-27 NOTE — HISTORY OF PRESENT ILLNESS
[No] : no [Yes] : yes [Yes > 3 months ago] : yes, > 3 months ago [None Known] : none known [Chronic pain or acute medical issues] : chronic pain or acute medical issues [Recent loss] : recent loss [Responsibility to family or others] : responsibility to family or others [Identifies reasons for living] : identifies reasons for living [Future oriented] : future oriented [Supportive social network or family] : supportive social network or family [Fear of death or dying due to pain/suffering] : fear of death or dying due to pain/suffering [High spirituality] : high spirituality [Positive therapeutic relationships] : positive therapeutic relationships [Ability to cope with stress] : ability to cope with stress [TextBox_32] : Patient does not appear to have any imminent threat to self and/or others at this time evidenced by lack of active suicidal ideation, intent, plan, future orientation, willing to seek support in times of crisis, residential stability, improved sleep, medication compliance, spirituality and [employer]. Safety plan reviewed. Chronic risk of chronic medical issues and past SA mitigated by above.  [FreeTextEntry1] : Pt came in person for his F/u. In order to protect the pt and prevent him from getting sick we placed him in a different room and set up solo system since the writer has residual symptoms of a cold. The pt has severely compromised immunity. Pt lost a bit of weight. Objectively, he looks a bit healthier. His speech is more coherent. Pt is afraid that ambien will stop working one day. This matter was discussed. Pt was comforted and educated. Reports being compliant. Says that he sleeps better after he takes neurontin and ambien qhs, yet it takes several hours to fall asleep.  Pt has his chronic passive suicidal thoughts, (has had them for years), but denies active SI, intent or plan and says that they are at baseline..   He doesn't attend [employer] and [employer] once weekly now because of his sickness, but maintains phone contact with his mother, brother who live on [place].   THERAPY 22MIN-Spoke about his david and God. Also, about "evil in this world". Pt has very eccentric believes (speaks a lot about black magic, exorcism, evil spirit, spells, etc). Pt has had these believes for many years (delusional disorder vs schizotypal PD?). Denies other sxs of acute exacerbation of depressive sxs. Denies thoughts to harm himself/others or end his life and engages in safety planning of calling writer, therapist, family, or 911 if he has thoughts of ending his life.  Denies sx of psychosis, leo.  Pt adamantly denies active SI/SP/intent/HI

## 2025-06-27 NOTE — DISCUSSION/SUMMARY
[FreeTextEntry1] : Pt came in person for his F/u. In order to protect the pt and prevent him from getting sick we placed him in a different room and set up solo system since the writer has residual symptoms of a cold. The pt has severely compromised immunity. Pt lost a bit of weight. Objectively, he looks a bit healthier. His speech is more coherent. Pt is afraid that ambien will stop working one day. This matter was discussed. Pt was comforted and educated. Reports being compliant. Says that he sleeps better after he takes neurontin and ambien qhs, yet it takes several hours to fall asleep.  Pt has his chronic passive suicidal thoughts, (has had them for years), but denies active SI, intent or plan and says that they are at baseline..   He doesn't attend [employer] and [employer] once weekly now because of his sickness, but maintains phone contact with his mother, brother who live on [place].   THERAPY 22MIN-Spoke about his david and God. Also, about "evil in this world". Pt has very eccentric believes (speaks a lot about black magic, exorcism, evil spirit, spells, etc). Pt has had these believes for many years (delusional disorder vs schizotypal PD?). Denies other sxs of acute exacerbation of depressive sxs. Denies thoughts to harm himself/others or end his life and engages in safety planning of calling writer, therapist, family, or 911 if he has thoughts of ending his life.  Denies sx of psychosis, leo.  Pt adamantly denies active SI/SP/intent/HI  Pt is a 61yo M, ,  ex wife (she took away their house and left him with nothing. He calls her evil. He got  in 2005 and was Dxd with MS in 2006). has MS (since 2006), has a daughter (20yo, his other daughter, who was 29, was killed in MVA in 2021) and ex wife in [place], domiciled alone (has a HA 8h 7 days a week), SSD, with long h/o mental illness, several prior IPP(started before he was Dxd with MS), no prior SA, (but with long h/o passive SI), who was seen by dr. Estrella. 1. Next appt in 1m in person 2. Continue meds(doesn't want to change anything). Continue celexa for depression and anxiety, abilify for psychosis and as augmentation for depression, trazodone and ambien for insomnia. I increased trazodone back to 300mg qhs (his insurance doesn't cover 3 tabs. I gave 150mg 2 tabs). I also told him to try to take 2 tabs of neurontin at night (he doesn't sleep well) 3. Needs PA for ambien q 6m at some point (I called last time and it didn't need PA) 4. In the past I spoke to his sister Lilly on the phone in pt's presence to discuss his wish for tysapri. (he is on ocrevus. His next injection is in February)

## 2025-07-18 NOTE — PHYSICAL EXAM
[No Acute Distress] : no acute distress [Well Nourished] : well nourished [Well Developed] : well developed [Well-Appearing] : well-appearing [Normal Sclera/Conjunctiva] : normal sclera/conjunctiva [PERRL] : pupils equal round and reactive to light [EOMI] : extraocular movements intact [No Lymphadenopathy] : no lymphadenopathy [No Respiratory Distress] : no respiratory distress  [No Accessory Muscle Use] : no accessory muscle use [Clear to Auscultation] : lungs were clear to auscultation bilaterally [Normal Rate] : normal rate  [Regular Rhythm] : with a regular rhythm [Normal S1, S2] : normal S1 and S2 [No Murmur] : no murmur heard [Soft] : abdomen soft [Non Tender] : non-tender [Non-distended] : non-distended [No Masses] : no abdominal mass palpated [Normal Affect] : the affect was normal [Normal Insight/Judgement] : insight and judgment were intact

## 2025-07-23 NOTE — REVIEW OF SYSTEMS
[Constipation] : constipation [Negative] : Heme/Lymph [Abdominal Pain] : no abdominal pain [Nausea] : no nausea [Vomiting] : no vomiting

## 2025-07-23 NOTE — ASSESSMENT
[FreeTextEntry1] : # Severe Constipation x 3 days - taking colace 2-3 times a day - also taking lactulose bowel prep daily, can increase to 2-3 times daily - if does not work will need to come to ER - GI referral sent  #JASON - Patient has JASON taking iron at home - Patient reports his last colonoscopy was years ago - GI referral sent - follow CBC  #Asthma/COPD on 2L of o2 at home PRN - following pulmonary - 2/2023 CT lung cancer screening: no nodules, unilateral L pleural calcifications - c/w BIPAP qHS - c/w spiriva, symbicort, albuterol, mucomist, and hypertonic saline nebulizers - c/w Chest Vibrating Vest - c/w Mucinex  #MS Complicated by neurogenic bladder and Chronic Pain - c/w CIC q 4-6hrs - now off of Tx, suggest close follow up with Neurology, appointment in September.  Family member help pt make medical appointments   # depression - followed by Mental Health - c/w abilify 7mg, Celexa 40 mg q24h, Ambien 12.5mg CR PO qhs, Trazodone 200mg PO qhs prn, melatonin 10mg po qhs - f/u with psychotherapist, last seen 1 month ago per patient  #Chronic Back pain #Diffuse pain - Percocet 5mg bid for severe pain PRN for 20 days; resent today - pain management referral given  # Endocrine - on testosterone replace therapy - helps with functional capacity given advanced MS  #DI -Follow up with nephrologist - check urine and serum osmolality -Desmopressin 2-2-2  RTC in 1 month with labs and PRN

## 2025-07-23 NOTE — HISTORY OF PRESENT ILLNESS
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 Speech and swallow showed that patient could tolerate a regular diet with thins.   Patient is feeling well today. Patient is on RA today. Patient reported that he uses oxygen PRN.   Patient reports 3 days of constipation today. Reported that fleets mineral oil worked last time and that he would like that again.  Patient has not followed up with GI since last visit. Last colonoscopy was years ago. Amenable to follow up with GI.   Patient does not recall last time they saw neurology. amenable to seeing them.  Patient does not recalll last time he saw a nephrologist. Patient is amenable to seeing one.

## 2025-07-23 NOTE — HISTORY OF PRESENT ILLNESS
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 Speech and swallow showed that patient could tolerate a regular diet with thins.   Patient is feeling well today. Patient is on RA today. Patient reported that he uses oxygen PRN.   Patient reports 3 days of constipation today. Reported that fleets mineral oil worked last time and that he would like that again.  Patient has not followed up with GI since last visit. Last colonoscopy was years ago. Amenable to follow up with GI.   Patient does not recall last time they saw neurology. amenable to seeing them.  Patient does not recalll last time he saw a nephrologist. Patient is amenable to seeing one.

## 2025-07-25 NOTE — DISCUSSION/SUMMARY
[FreeTextEntry1] : Pt came in person for his F/u. Pt says that his sleep meds stopped working. He is up all night and feels very sad and anxious due to that. Pt never tried remeron. Risks and benefits of remeron were discussed. Pt will try remeron (will try to not take ambien if it's possible). We will be adjusting the dose and I will see him again in 2 weeks. Reports being compliant. Pt has his chronic passive suicidal thoughts, (has had them for years), but denies active SI, intent or plan and says that they are at baseline.   He doesn't attend [employer] and [employer] once weekly now because of his sickness, but maintains phone contact with his mother, brother who live on [place].   THERAPY 22MIN-Spoke about his david and God. Also, about "evil in this world". Pt has very eccentric believes (speaks a lot about black magic, exorcism, evil spirit, spells, etc). Pt has had these believes for many years (delusional disorder vs schizotypal PD?). Denies other sxs of acute exacerbation of depressive sxs. Denies thoughts to harm himself/others or end his life and engages in safety planning of calling writer, therapist, family, or 911 if he has thoughts of ending his life.  Denies sx of psychosis, leo.  Pt adamantly denies active SI/SP/intent/HI Time spent for therapy doesn't include time spent for E/M  Pt is a 59yo M, ,  ex-wife (she took away their house and left him with nothing. He calls her evil. He got  in 2005 and was Dxd with MS in 2006). has MS (since 2006), has a daughter (22yo, his other daughter, who was 29, was killed in MVA in 2021) and ex wife in [place], domiciled alone (has a HA 8h 7 days a week), SSD, with long h/o mental illness, several prior IPP(started before he was Dxd with MS), no prior SA, (but with long h/o passive SI), who was seen by dr. Estrella.  1. Next appt in 2w tele (we showed his new HA Avinash)how to use Solo. 2. WE STARTED REMERON 15 MINS (BECAUSE HE STOPPED SLEEPING). DOES HE TOLERATE? INCREASE? 2. Continue meds(doesn't want to change anything). Continue celexa for depression and anxiety, abilify for psychosis and as augmentation for depression, trazodone and ambien for insomnia. I increased trazodone back to 300mg qhs (his insurance doesn't cover 3 tabs. I gave 150mg 2 tabs). I also told him to try to take 2 tabs of neurontin at night (he doesn't sleep well) 3. Needs PA for ambien q 6m at some point (I called last time and it didn't need PA) 4. In the past I spoke to his sister Lilly on the phone in pt's presence to discuss his wish for tysapri. (he is on ocrevus. His next injection is in February)

## 2025-07-25 NOTE — HISTORY OF PRESENT ILLNESS
[No] : no [Yes] : yes [Yes > 3 months ago] : yes, > 3 months ago [None Known] : none known [Chronic pain or acute medical issues] : chronic pain or acute medical issues [Recent loss] : recent loss [Responsibility to family or others] : responsibility to family or others [Identifies reasons for living] : identifies reasons for living [Future oriented] : future oriented [Supportive social network or family] : supportive social network or family [Fear of death or dying due to pain/suffering] : fear of death or dying due to pain/suffering [High spirituality] : high spirituality [Positive therapeutic relationships] : positive therapeutic relationships [Ability to cope with stress] : ability to cope with stress [TextBox_32] : Patient does not appear to have any imminent threat to self and/or others at this time evidenced by lack of active suicidal ideation, intent, plan, future orientation, willing to seek support in times of crisis, residential stability, improved sleep, medication compliance, spirituality and Confucianist. Safety plan reviewed. Chronic risk of chronic medical issues and past SA mitigated by above.  [FreeTextEntry1] : Pt came in person for his F/u. Pt says that his sleep meds stopped working. He is up all night and feels very sad and anxious due to that. Pt never tried remeron. Risks and benefits of remeron were discussed. Pt will try remeron (will try to not take ambien if it's possible). We will be adjusting the dose and I will see him again in 2 weeks. Reports being compliant. Pt has his chronic passive suicidal thoughts, (has had them for years), but denies active SI, intent or plan and says that they are at baseline.   He doesn't attend [employer] and [employer] once weekly now because of his sickness, but maintains phone contact with his mother, brother who live on [place].   THERAPY 22MIN-Spoke about his david and God. Also, about "evil in this world". Pt has very eccentric believes (speaks a lot about black magic, exorcism, evil spirit, spells, etc). Pt has had these believes for many years (delusional disorder vs schizotypal PD?). Denies other sxs of acute exacerbation of depressive sxs. Denies thoughts to harm himself/others or end his life and engages in safety planning of calling writer, therapist, family, or 911 if he has thoughts of ending his life.  Denies sx of psychosis, leo.  Pt adamantly denies active SI/SP/intent/HI

## 2025-07-25 NOTE — PHYSICAL EXAM
[Spastic] : spastic [0] : 0 [1] : 1 [No] : No [Dysarthria] : dysarthria [Delusions] : delusions [Dysphoric] : dysphoric [Constricted] : constricted [Normal] : good [FreeTextEntry2] : MS. In a wheelchair [FreeTextEntry3] : MS [FreeTextEntry4] : MS [FreeTextEntry1] : disabled in wheelchair [FreeTextEntry5] : severe impairment [FreeTextEntry8] : so-so [de-identified] : grossly intact [de-identified] : grossly intact [de-identified] : grossly intact [de-identified] : grossly intact